# Patient Record
Sex: FEMALE | Race: WHITE | NOT HISPANIC OR LATINO | Employment: OTHER | URBAN - METROPOLITAN AREA
[De-identification: names, ages, dates, MRNs, and addresses within clinical notes are randomized per-mention and may not be internally consistent; named-entity substitution may affect disease eponyms.]

---

## 2017-01-21 ENCOUNTER — HOSPITAL ENCOUNTER (EMERGENCY)
Facility: CLINIC | Age: 32
Discharge: HOME | End: 2017-01-21
Attending: EMERGENCY MEDICINE | Admitting: EMERGENCY MEDICINE
Payer: COMMERCIAL

## 2017-01-21 ENCOUNTER — APPOINTMENT (OUTPATIENT)
Dept: GENERAL RADIOLOGY | Facility: CLINIC | Age: 32
End: 2017-01-21
Attending: EMERGENCY MEDICINE
Payer: COMMERCIAL

## 2017-01-21 VITALS
DIASTOLIC BLOOD PRESSURE: 96 MMHG | HEART RATE: 90 BPM | OXYGEN SATURATION: 95 % | SYSTOLIC BLOOD PRESSURE: 113 MMHG | TEMPERATURE: 98.3 F | RESPIRATION RATE: 18 BRPM | BODY MASS INDEX: 41.02 KG/M2 | HEIGHT: 71 IN | WEIGHT: 293 LBS

## 2017-01-21 DIAGNOSIS — R07.89 CHEST WALL PAIN: ICD-10-CM

## 2017-01-21 DIAGNOSIS — J06.9 UPPER RESPIRATORY TRACT INFECTION, UNSPECIFIED TYPE: ICD-10-CM

## 2017-01-21 DIAGNOSIS — R06.02 SOB (SHORTNESS OF BREATH): ICD-10-CM

## 2017-01-21 LAB
ALBUMIN SERPL-MCNC: 2.8 G/DL (ref 3.4–5)
ALP SERPL-CCNC: 63 U/L (ref 40–150)
ALT SERPL W P-5'-P-CCNC: 15 U/L (ref 0–50)
ANION GAP SERPL CALCULATED.3IONS-SCNC: 9 MMOL/L (ref 3–14)
AST SERPL W P-5'-P-CCNC: 13 U/L (ref 0–45)
BASOPHILS # BLD AUTO: 0 10E9/L (ref 0–0.2)
BASOPHILS NFR BLD AUTO: 0.6 %
BILIRUB SERPL-MCNC: 0.2 MG/DL (ref 0.2–1.3)
BUN SERPL-MCNC: 12 MG/DL (ref 7–30)
CALCIUM SERPL-MCNC: 8.9 MG/DL (ref 8.5–10.1)
CHLORIDE SERPL-SCNC: 101 MMOL/L (ref 94–109)
CO2 SERPL-SCNC: 31 MMOL/L (ref 20–32)
CREAT SERPL-MCNC: 0.76 MG/DL (ref 0.52–1.04)
D DIMER PPP FEU-MCNC: 0.4 UG/ML FEU (ref 0–0.5)
DIFFERENTIAL METHOD BLD: ABNORMAL
EOSINOPHIL # BLD AUTO: 0.1 10E9/L (ref 0–0.7)
EOSINOPHIL NFR BLD AUTO: 2 %
ERYTHROCYTE [DISTWIDTH] IN BLOOD BY AUTOMATED COUNT: 15 % (ref 10–15)
GFR SERPL CREATININE-BSD FRML MDRD: 87 ML/MIN/1.7M2
GLUCOSE SERPL-MCNC: 111 MG/DL (ref 70–99)
HCG SERPL QL: NEGATIVE
HCT VFR BLD AUTO: 42 % (ref 35–47)
HGB BLD-MCNC: 12.7 G/DL (ref 11.7–15.7)
IMM GRANULOCYTES # BLD: 0 10E9/L (ref 0–0.4)
IMM GRANULOCYTES NFR BLD: 0.1 %
LYMPHOCYTES # BLD AUTO: 3.1 10E9/L (ref 0.8–5.3)
LYMPHOCYTES NFR BLD AUTO: 45.2 %
MCH RBC QN AUTO: 24.9 PG (ref 26.5–33)
MCHC RBC AUTO-ENTMCNC: 30.2 G/DL (ref 31.5–36.5)
MCV RBC AUTO: 82 FL (ref 78–100)
MONOCYTES # BLD AUTO: 0.4 10E9/L (ref 0–1.3)
MONOCYTES NFR BLD AUTO: 6.1 %
NEUTROPHILS # BLD AUTO: 3.2 10E9/L (ref 1.6–8.3)
NEUTROPHILS NFR BLD AUTO: 46 %
NRBC # BLD AUTO: 0 10*3/UL
NRBC BLD AUTO-RTO: 0 /100
NT-PROBNP SERPL-MCNC: 108 PG/ML (ref 0–450)
PLATELET # BLD AUTO: 249 10E9/L (ref 150–450)
POTASSIUM SERPL-SCNC: 4.2 MMOL/L (ref 3.4–5.3)
PROT SERPL-MCNC: 6.7 G/DL (ref 6.8–8.8)
RBC # BLD AUTO: 5.11 10E12/L (ref 3.8–5.2)
SODIUM SERPL-SCNC: 141 MMOL/L (ref 133–144)
TROPONIN I SERPL-MCNC: NORMAL UG/L (ref 0–0.04)
WBC # BLD AUTO: 6.9 10E9/L (ref 4–11)

## 2017-01-21 PROCEDURE — 84703 CHORIONIC GONADOTROPIN ASSAY: CPT | Performed by: EMERGENCY MEDICINE

## 2017-01-21 PROCEDURE — 99285 EMERGENCY DEPT VISIT HI MDM: CPT

## 2017-01-21 PROCEDURE — 80053 COMPREHEN METABOLIC PANEL: CPT | Performed by: EMERGENCY MEDICINE

## 2017-01-21 PROCEDURE — 85025 COMPLETE CBC W/AUTO DIFF WBC: CPT | Performed by: EMERGENCY MEDICINE

## 2017-01-21 PROCEDURE — 25000125 ZZHC RX 250: Performed by: EMERGENCY MEDICINE

## 2017-01-21 PROCEDURE — 71020 XR CHEST 2 VW: CPT

## 2017-01-21 PROCEDURE — 85379 FIBRIN DEGRADATION QUANT: CPT | Performed by: EMERGENCY MEDICINE

## 2017-01-21 PROCEDURE — 84484 ASSAY OF TROPONIN QUANT: CPT | Performed by: EMERGENCY MEDICINE

## 2017-01-21 PROCEDURE — 96374 THER/PROPH/DIAG INJ IV PUSH: CPT

## 2017-01-21 PROCEDURE — 83880 ASSAY OF NATRIURETIC PEPTIDE: CPT | Performed by: EMERGENCY MEDICINE

## 2017-01-21 PROCEDURE — 93005 ELECTROCARDIOGRAM TRACING: CPT

## 2017-01-21 RX ORDER — KETOROLAC TROMETHAMINE 30 MG/ML
30 INJECTION, SOLUTION INTRAMUSCULAR; INTRAVENOUS ONCE
Status: COMPLETED | OUTPATIENT
Start: 2017-01-21 | End: 2017-01-21

## 2017-01-21 RX ORDER — LIDOCAINE 40 MG/G
CREAM TOPICAL
Status: DISCONTINUED | OUTPATIENT
Start: 2017-01-21 | End: 2017-01-21 | Stop reason: HOSPADM

## 2017-01-21 RX ADMIN — KETOROLAC TROMETHAMINE 30 MG: 30 INJECTION, SOLUTION INTRAMUSCULAR at 07:10

## 2017-01-21 ASSESSMENT — ENCOUNTER SYMPTOMS
FEVER: 0
LIGHT-HEADEDNESS: 1
NAUSEA: 1
SHORTNESS OF BREATH: 1
COUGH: 0
SORE THROAT: 0
VOICE CHANGE: 1
VOMITING: 0

## 2017-01-21 NOTE — ED AVS SNAPSHOT
[facility]    [address]    [place] [address]    Phone:  [phone]    Fax:  [phone]                                       Shannan Savage   MRN: [id number]    Department:  [facility]   Date of Visit:  1/21/2017           After Visit Summary Signature Page     I have received my discharge instructions, and my questions have been answered. I have discussed any challenges I see with this plan with the nurse or doctor.    ..........................................................................................................................................  Patient/Patient Representative Signature      ..........................................................................................................................................  Patient Representative Print Name and Relationship to Patient    ..................................................               ................................................  Date                                            Time    ..........................................................................................................................................  Reviewed by Signature/Title    ...................................................              ..............................................  Date                                                            Time

## 2017-01-21 NOTE — DISCHARGE INSTRUCTIONS
Discharge Instructions  Chest Pain    You have been seen today for chest pain or discomfort.  At this time, your doctor has found no signs that your chest pain is due to a serious or life-threatening condition, (or you have declined more testing and/or admission to the hospital). However, sometimes there is a serious problem that does not show up right away. Your evaluation today may not be complete and you may need further testing and evaluation.     You need to follow-up with your regular doctor within 3 days.    Return to the Emergency Department if:    Your chest pain changes, gets worse, starts to happen more often, or comes with less activity.    You are short of breath.    You get very weak or tired.    You pass out or faint.    You have any new symptoms, like fever, cough, numb legs, or you cough up blood.    You have anything else that worries you.    Until you follow-up with your regular doctor please do the following:    Take one aspirin daily unless you have an allergy or are told not to by your doctor.    If a stress test appointment has been made, go to the appointment.    If you have questions, contact your regular doctor.    If your doctor today has told you to follow-up with your regular doctor, it is very important that you make an appointment with your clinic and go to the appointment.  If you do not follow-up with your primary doctor, it may result in missing an important development which could result in permanent injury or disability and/or lasting pain.  If there is any problem keeping your appointment, call your doctor or return to the Emergency Department.    If you were given a prescription for medicine here today, be sure to read all of the information (including the package insert) that comes with your prescription.  This will include important information about the medicine, its side effects, and any warnings that you need to know about.  The pharmacist who fills the prescription can  provide more information and answer questions you may have about the medicine.  If you have questions or concerns that the pharmacist cannot address, please call or return to the Emergency Department.     Opioid Medication Information    Pain medications are among the most commonly prescribed medicines, so we are including this information for all our patients. If you did not receive pain medication or get a prescription for pain medicine, you can ignore it.     You may have been given a prescription for an opioid (narcotic) pain medicine and/or have received a pain medicine while here in the Emergency Department. These medicines can make you drowsy or impaired. You must not drive, operate dangerous equipment, or engage in any other dangerous activities while taking these medications. If you drive while taking these medications, you could be arrested for DUI, or driving under the influence. Do not drink any alcohol while you are taking these medications.     Opioid pain medications can cause addiction. If you have a history of chemical dependency of any type, you are at a higher risk of becoming addicted to pain medications.  Only take these prescribed medications to treat your pain when all other options have been tried. Take it for as short a time and as few doses as possible. Store your pain pills in a secure place, as they are frequently stolen and provide a dangerous opportunity for children or visitors in your house to start abusing these powerful medications. We will not replace any lost or stolen medicine.  As soon as your pain is better, you should flush all your remaining medication.     Many prescription pain medications contain Tylenol  (acetaminophen), including Vicodin , Tylenol #3 , Norco , Lortab , and Percocet .  You should not take any extra pills of Tylenol  if you are using these prescription medications or you can get very sick.  Do not ever take more than 3000 mg of acetaminophen in any 24 hour  period.    All opioids tend to cause constipation. Drink plenty of water and eat foods that have a lot of fiber, such as fruits, vegetables, prune juice, apple juice and high fiber cereal.  Take a laxative if you don t move your bowels at least every other day. Miralax , Milk of Magnesia, Colace , or Senna  can be used to keep you regular.      Remember that you can always come back to the Emergency Department if you are not able to see your regular doctor in the amount of time listed above, if you get any new symptoms, or if there is anything that worries you.

## 2017-01-21 NOTE — ED PROVIDER NOTES
"  History     Chief Complaint:  Chest Pain and Shortness of Breath      HPI   Shannan Savage is a 31 year old female with history of prediabetes and morbid obesity who presents to the emergency department via EMS with chest pain and shortness of breath.  The patient woke at 0430 this morning and on sitting up felt \"dizzy\" described not as if the room was spinning but as if she were going to pass out and nauseous.  She was able to ambulate to the bathroom and after sitting down she noticed midsternal chest pain that was tender to palpation and associated by increased lightheadedness and shortness of breath so she yelled for her mother who she lives with who then contacted EMS.  Here in the emergency department patient reports her chest is  but shortness of breath has largely resolved; the shortness of breath lasted approximately 30 minutes.  She describes her shortness of breath as a sensation like she was unable to fill her lungs completely.  Patient also reports her voice is hoarse which is new today.  She denies recent illness including cough, congestion, sore throat, vomiting, diarrhea, trouble with gait, or other complaint.      CARDIAC RISK FACTORS:  Sex:    F  Tobacco:   Neg  Hypertension:   Neg  Hyperlipidemia:  Neg  Diabetes:   Prediabetes  Family History:  Neg    PE/DVT RISK FACTORS:  Sex:    F  Hormones:   Neg  Tobacco:   Neg  Cancer:   Neg  Travel:   Neg  Surgery:   Neg  Other immobilization: Neg  Personal history:  Neg  Family history:  Neg     Allergies:  Codeine      Medications:    Citalopram hydrobromide  Flexeril  Norco  Imitrex  Depakote  Jasmine  Ponstel  Midrin  Vitamin D + cholecalciferol   Topiramate     Past Medical History:    Morbid obesity  Migraine  Bipolar 1 disorder  Prediabetes    Past Surgical History:    GI surgery     Family History:    Father - Mesothelioma      Social History:  Presents via EMS; mother and sister met in ED  Tobacco use: Never  Alcohol use: Negative " "  Marital Status:  Single        Review of Systems   Constitutional: Negative for fever.   HENT: Positive for voice change. Negative for congestion and sore throat.    Respiratory: Positive for shortness of breath. Negative for cough.    Cardiovascular: Positive for chest pain.   Gastrointestinal: Positive for nausea. Negative for vomiting.   Musculoskeletal: Negative for gait problem.   Neurological: Positive for light-headedness.   All other systems reviewed and are negative.      Physical Exam     Patient Vitals for the past 24 hrs:   BP Temp Temp src Pulse Heart Rate Resp SpO2 Height Weight   01/21/17 0535 123/65 mmHg 98.3  F (36.8  C) Oral 90 90 18 95 % 1.803 m (5' 11\") (!) 190.511 kg (420 lb)       Physical Exam  General: The patient is alert, in no respiratory distress.  Morbid obesity.    HENT: Mucous membranes moist.    Cardiovascular: Regular rate and rhythm. Good pulses in all four extremities. Normal capillary refill and skin turgor.     Respiratory: Lungs are clear. No nasal flaring. No retractions. No wheezing, no crackles.    Gastrointestinal: Abdomen soft. No guarding, no rebound. No palpable hernias.     Musculoskeletal: No gross deformity.  Exquisite tenderness to palpation over sternum in area of indicated pain.      Skin: No rashes or petechiae.     Neurologic: The patient is alert and oriented x3. GCS 15. No testable cranial nerve deficit. Follows commands with clear and appropriate speech. Gives appropriate answers. Good strength in all extremities. No gross neurologic deficit. Gross sensation intact. Pupils are round and reactive. No meningismus.     Lymphatic: No cervical adenopathy. No lower extremity swelling.    Psychiatric: The patient is non-tearful.     Emergency Department Course   ECG (05:36:36):  Rate 92 bpm. ID interval 140. QRS duration 72. QT/QTc 336/415. P-R-T axes 41 48 50.  Normal sinus rhythm.  Normal ECG.  Interpreted at 0521 by Jody Almanza MD.     Imaging:  Radiographic " findings were communicated with the patient who voiced understanding of the findings.    XR Chest:  IMPRESSION: No acute abnormality.    CHHAYA PHILLIPS MD    Preliminary result per radiology.    Laboratory:  CBC: WNL (WBC 6.9, HGB 12.7, )   CMP: Albumin 2.8 (L), Protein total 6.7 (L), Glucose 111 (H) ow WNL (Creatinine 0.76)   D-dimer: 0.4   0536: Troponin: <0.015   Nt proBNP: 108    HCG qual: Negative    Interventions:  0710: Toradol 30 mg IV     Emergency Department Course:  The patient arrived in the emergency department via EMS.  Patient initially seen by my partner Dr. Almanza.   IV inserted and blood drawn. The patient was placed on continuous cardiac monitoring and pulse oximetry.   Past medical records, nursing notes, and vitals reviewed.  0605: I performed an exam of the patient as documented above.    The patient was sent for a XR while in the emergency department, findings above.   0704: I rechecked the patient. Explained findings to patient and family.  She was still experiencing pain.   I personally reviewed the laboratory results with the Patient and mother and sister and answered all related questions prior to discharge.    0731: I rechecked the patient.  Findings and plan explained to the Patient and mother and sister. Patient discharged home with instructions regarding supportive care, medications, and reasons to return. The importance of close follow-up was reviewed.      Impression & Plan    Medical Decision Making:  Shannan Savage is a 31 year old female reporting she first felt dizzy this morning which sounds more like lightheaded type neurologic cause and reports she has had cold-like symptoms prior to this starting.  Only later did she complain of chest pain and shortness of breath.  During this whole episode, however, she was able to yell and talk and here she is currently speaking in full sentences.  Her pulmonary exam sounds quite clear, I do not think she likely has CHF, PE,  pneumonia, or other cause.  Her d-dimer did come back negative.  Troponin is negative too despite having symptoms for some time.  This is only a screening study, however, I felt as chest pain is reproducible with light palpation this is likely inflammatory given she has a cold on top of this.  Patient walked in the ER after workup was negative without any problems.  She currently says her breathing is doing better.  I started her on scheduled antiinflammatories.  WE discussed risk factor modification but I felt that right now there is no sign of cardiac ischemia and her story is unlikely for a heart related cause and more likely secondary to URI.  She was discharged to close follow up.     Diagnosis:    ICD-10-CM    1. Chest wall pain R07.89    2. SOB (shortness of breath) R06.02    3. Upper respiratory tract infection, unspecified type J06.9        Disposition:  Discharged to home with plan as outlined.      Florentino Grant  1/21/2017   [facility] EMERGENCY DEPARTMENT    I, Florentino Grant, am serving as a scribe at 6:05 AM on 1/21/2017 to document services personally performed by Cristobal Saavedra MD based on my observations and the provider's statements to me.      Cristobal Saavedra MD  01/22/17 [id number]

## 2017-01-21 NOTE — ED NOTES
Bed: ED15  Expected date: 1/21/17  Expected time: 5:17 AM  Means of arrival: Ambulance  Comments:  Santi Vaca

## 2017-01-21 NOTE — ED AVS SNAPSHOT
[facility]    [address] [place] [address]    Phone:  [phone]    Fax:  [phone]                                       Shannna Savage   MRN: [id number]    Department:  [facility]   Date of Visit:  1/21/2017           Patient Information     Date Of Birth          1985        Your diagnoses for this visit were:     Chest wall pain     SOB (shortness of breath)     Upper respiratory tract infection, unspecified type        You were seen by Jody Almanza MD and Cristobal Saavedra MD.      Follow-up Information     Follow up with Omer Ziegler MD. Schedule an appointment as soon as possible for a visit in 3 days.    Specialty:  Family Practice    Contact information:    [facility]  [address]  [facility] [address]  [phone]          Discharge Instructions       Discharge Instructions  Chest Pain    You have been seen today for chest pain or discomfort.  At this time, your doctor has found no signs that your chest pain is due to a serious or life-threatening condition, (or you have declined more testing and/or admission to the hospital). However, sometimes there is a serious problem that does not show up right away. Your evaluation today may not be complete and you may need further testing and evaluation.     You need to follow-up with your regular doctor within 3 days.    Return to the Emergency Department if:    Your chest pain changes, gets worse, starts to happen more often, or comes with less activity.    You are short of breath.    You get very weak or tired.    You pass out or faint.    You have any new symptoms, like fever, cough, numb legs, or you cough up blood.    You have anything else that worries you.    Until you follow-up with your regular doctor please do the following:    Take one aspirin daily unless you have an allergy or are told not to by your doctor.    If a  stress test appointment has been made, go to the appointment.    If you have questions, contact your regular doctor.    If your doctor today has told you to follow-up with your regular doctor, it is very important that you make an appointment with your clinic and go to the appointment.  If you do not follow-up with your primary doctor, it may result in missing an important development which could result in permanent injury or disability and/or lasting pain.  If there is any problem keeping your appointment, call your doctor or return to the Emergency Department.    If you were given a prescription for medicine here today, be sure to read all of the information (including the package insert) that comes with your prescription.  This will include important information about the medicine, its side effects, and any warnings that you need to know about.  The pharmacist who fills the prescription can provide more information and answer questions you may have about the medicine.  If you have questions or concerns that the pharmacist cannot address, please call or return to the Emergency Department.     Opioid Medication Information    Pain medications are among the most commonly prescribed medicines, so we are including this information for all our patients. If you did not receive pain medication or get a prescription for pain medicine, you can ignore it.     You may have been given a prescription for an opioid (narcotic) pain medicine and/or have received a pain medicine while here in the Emergency Department. These medicines can make you drowsy or impaired. You must not drive, operate dangerous equipment, or engage in any other dangerous activities while taking these medications. If you drive while taking these medications, you could be arrested for DUI, or driving under the influence. Do not drink any alcohol while you are taking these medications.     Opioid pain medications can cause addiction. If you have a history of  chemical dependency of any type, you are at a higher risk of becoming addicted to pain medications.  Only take these prescribed medications to treat your pain when all other options have been tried. Take it for as short a time and as few doses as possible. Store your pain pills in a secure place, as they are frequently stolen and provide a dangerous opportunity for children or visitors in your house to start abusing these powerful medications. We will not replace any lost or stolen medicine.  As soon as your pain is better, you should flush all your remaining medication.     Many prescription pain medications contain Tylenol  (acetaminophen), including Vicodin , Tylenol #3 , Norco , Lortab , and Percocet .  You should not take any extra pills of Tylenol  if you are using these prescription medications or you can get very sick.  Do not ever take more than 3000 mg of acetaminophen in any 24 hour period.    All opioids tend to cause constipation. Drink plenty of water and eat foods that have a lot of fiber, such as fruits, vegetables, prune juice, apple juice and high fiber cereal.  Take a laxative if you don t move your bowels at least every other day. Miralax , Milk of Magnesia, Colace , or Senna  can be used to keep you regular.      Remember that you can always come back to the Emergency Department if you are not able to see your regular doctor in the amount of time listed above, if you get any new symptoms, or if there is anything that worries you.          24 Hour Appointment Hotline       To make an appointment at any [facility], call [phone] ([phone]). If you don't have a family doctor or clinic, we will help you find one. [facility] are conveniently located to serve the needs of you and your family.             Review of your medicines      Our records show that you are taking the medicines listed below. If these are incorrect, please call your family doctor or clinic.        Dose /  Directions Last dose taken    NANCY PO   Dose:  0.5 mg        Take 0.5 mg by mouth   Refills:  0        CITALOPRAM HYDROBROMIDE PO   Dose:  20 mg        Take 20 mg by mouth 2 times daily   Refills:  0        DEPAKOTE PO   Dose:  150 mg        Take 150 mg by mouth   Refills:  0        FLEXERIL PO   Dose:  5 mg        Take 5 mg by mouth   Refills:  0        HYDROcodone-acetaminophen 5-325 MG per tablet   Commonly known as:  NORCO   Dose:  1 tablet        Take 1 tablet by mouth every 6 hours as needed for moderate to severe pain   Refills:  0        mefenamic acid 250 MG Caps capsule   Commonly known as:  PONSTEL   Dose:  250 mg        Take 250 mg by mouth every 6 hours as needed for moderate pain   Refills:  0        MIDRIN PO   Dose:  150 mg        Take 150 mg by mouth   Refills:  0        SUMAtriptan Succinate Refill 6 MG/0.5ML Soct   Commonly known as:  IMITREX        Inject Subcutaneous once   Refills:  0        TOPIRAMATE PO   Dose:  150 mg        Take 150 mg by mouth   Refills:  0        VITAMIN D (CHOLECALCIFEROL) PO   Dose:  99133 Units        Take 50,000 Units by mouth daily   Refills:  0                Procedures and tests performed during your visit     CBC with platelets differential    Cardiac Continuous Monitoring    Comprehensive metabolic panel    D dimer quantitative    EKG 12-lead, tracing only    HCG qualitative    Nt probnp inpatient    Peripheral IV: Standard    Pulse oximetry nursing    Troponin I    Vital signs    XR Chest 2 Views      Orders Needing Specimen Collection     None      Pending Results     No orders found from 1/20/2017 to 1/22/2017.            Pending Culture Results     No orders found from 1/20/2017 to 1/22/2017.       Test Results from your hospital stay           1/21/2017  6:02 AM - Interface, xzoops Results      Component Results     Component Value Ref Range & Units Status    WBC 6.9 4.0 - 11.0 10e9/L Final    RBC Count 5.11 3.8 - 5.2 10e12/L Final    Hemoglobin 12.7  11.7 - 15.7 g/dL Final    Hematocrit 42.0 35.0 - 47.0 % Final    MCV 82 78 - 100 fl Final    MCH 24.9 (L) 26.5 - 33.0 pg Final    MCHC 30.2 (L) 31.5 - 36.5 g/dL Final    RDW 15.0 10.0 - 15.0 % Final    Platelet Count 249 150 - 450 10e9/L Final    Diff Method Automated Method  Final    % Neutrophils 46.0 % Final    % Lymphocytes 45.2 % Final    % Monocytes 6.1 % Final    % Eosinophils 2.0 % Final    % Basophils 0.6 % Final    % Immature Granulocytes 0.1 % Final    Nucleated RBCs 0 0 /100 Final    Absolute Neutrophil 3.2 1.6 - 8.3 10e9/L Final    Absolute Lymphocytes 3.1 0.8 - 5.3 10e9/L Final    Absolute Monocytes 0.4 0.0 - 1.3 10e9/L Final    Absolute Eosinophils 0.1 0.0 - 0.7 10e9/L Final    Absolute Basophils 0.0 0.0 - 0.2 10e9/L Final    Abs Immature Granulocytes 0.0 0 - 0.4 10e9/L Final    Absolute Nucleated RBC 0.0  Final         1/21/2017  6:23 AM - Interface, Flexilab Results      Component Results     Component Value Ref Range & Units Status    Sodium 141 133 - 144 mmol/L Final    Potassium 4.2 3.4 - 5.3 mmol/L Final    Chloride 101 94 - 109 mmol/L Final    Carbon Dioxide 31 20 - 32 mmol/L Final    Anion Gap 9 3 - 14 mmol/L Final    Glucose 111 (H) 70 - 99 mg/dL Final    Urea Nitrogen 12 7 - 30 mg/dL Final    Creatinine 0.76 0.52 - 1.04 mg/dL Final    GFR Estimate 87 >60 mL/min/1.7m2 Final    Non  GFR Calc    GFR Estimate If Black >90   GFR Calc   >60 mL/min/1.7m2 Final    Calcium 8.9 8.5 - 10.1 mg/dL Final    Bilirubin Total 0.2 0.2 - 1.3 mg/dL Final    Albumin 2.8 (L) 3.4 - 5.0 g/dL Final    Protein Total 6.7 (L) 6.8 - 8.8 g/dL Final    Alkaline Phosphatase 63 40 - 150 U/L Final    ALT 15 0 - 50 U/L Final    AST 13 0 - 45 U/L Final         1/21/2017  6:13 AM - Interface, Flexilab Results      Component Results     Component Value Ref Range & Units Status    D Dimer 0.4 0.0 - 0.50 ug/ml FEU Final    This D-dimer assay is intended for use in conjuntion with a clinical  pretest   probability assessment model to exclude pulmonary embolism (PE) and as an aid   in the diagnosis of deep venous thrombosis (DVT) in outpatients suspected of   PE   or DVT. The cut-off value is 0.5 g/mL FEU.           1/21/2017  6:24 AM - Interface, Flexilab Results      Component Results     Component Value Ref Range & Units Status    Troponin I ES  0.000 - 0.045 ug/L Final    <0.015  The 99th percentile for upper reference range is 0.045 ug/L.  Troponin values in   the range of 0.045 - 0.120 ug/L may be associated with risks of adverse   clinical events.           1/21/2017  6:24 AM - Interface, Flexilab Results      Component Results     Component Value Ref Range & Units Status    N-Terminal Pro BNP Inpatient 108 0 - 450 pg/mL Final    Reference range shown and results flagged as abnormal are suggested inpatient   cut points for confirming diagnosis if CHF in an acute setting. Establishing   a   baseline value for each individual patient is useful for follow-up. An   inpatient or emergency department NT-proPBNP <300 pg/mL effectively rules out   acute CHF, with 99% negative predictive value.  The outpatient non-acute reference range for ruling out CHF is:   0-125 pg/mL (age 18 to less than 75)   0-450 pg/mL (age 75 yrs and older)           1/21/2017  6:43 AM - Interface, Radiant Ib      Narrative     XR CHEST 2 VW  1/21/2017 6:26 AM      HISTORY: Shortness of breath.     COMPARISON: 3/24/2015.    FINDINGS: The heart size is normal. The lungs are clear. No  pneumothorax or pleural effusion.        Impression     IMPRESSION: No acute abnormality.    CHHAYA PHILLIPS MD         1/21/2017  6:23 AM - Interface, Flexilab Results      Component Results     Component Value Ref Range & Units Status    HCG Qualitative Serum Negative NEG Final                Clinical Quality Measure: Blood Pressure Screening     Your blood pressure was checked while you were in the emergency department today. The last reading we  "obtained was  BP: 123/65 mmHg . Please read the guidelines below about what these numbers mean and what you should do about them.  If your systolic blood pressure (the top number) is less than 120 and your diastolic blood pressure (the bottom number) is less than 80, then your blood pressure is normal. There is nothing more that you need to do about it.  If your systolic blood pressure (the top number) is 120-139 or your diastolic blood pressure (the bottom number) is 80-89, your blood pressure may be higher than it should be. You should have your blood pressure rechecked within a year by a primary care provider.  If your systolic blood pressure (the top number) is 140 or greater or your diastolic blood pressure (the bottom number) is 90 or greater, you may have high blood pressure. High blood pressure is treatable, but if left untreated over time it can put you at risk for heart attack, stroke, or kidney failure. You should have your blood pressure rechecked by a primary care provider within the next 4 weeks.  If your provider in the emergency department today gave you specific instructions to follow-up with your doctor or provider even sooner than that, you should follow that instruction and not wait for up to 4 weeks for your follow-up visit.        Thank you for choosing [facility]       Thank you for choosing [facility] for your care. Our goal is always to provide you with excellent care. Hearing back from our patients is one way we can continue to improve our services. Please take a few minutes to complete the written survey that you may receive in the mail after you visit with us. Thank you!        [facility] Information     [employer] lets you send messages to your doctor, view your test results, renew your prescriptions, schedule appointments and more. To sign up, go to [email or web address] . Click on \"Log in\" on the left side of the screen, which will take you to the Welcome page. Then click on \"Sign up Now\" on " the right side of the page.     You will be asked to enter the access code listed below, as well as some personal information. Please follow the directions to create your username and password.     Your access code is: [email or web address]  Expires: 2017  7:31 AM     Your access code will  in 90 days. If you need help or a new code, please call your [facility] clinic or [phone].        Care EveryWhere ID     This is your Care EveryWhere ID. This could be used by other organizations to access your [facility] medical records  [id number]        After Visit Summary       This is your record. Keep this with you and show to your community pharmacist(s) and doctor(s) at your next visit.

## 2017-01-21 NOTE — ED NOTES
BIB EMS after waking to feeling dizzy, got up to BR and began to have SOB and CP at the same time; states she was not exerting self when it happened; states chest pain is substernal and nonradiating; LS clear bilaterally  ABCs intact.

## 2017-01-23 LAB — INTERPRETATION ECG - MUSE: NORMAL

## 2017-03-27 ENCOUNTER — TELEPHONE (OUTPATIENT)
Dept: FAMILY MEDICINE | Facility: CLINIC | Age: 32
End: 2017-03-27

## 2017-03-27 NOTE — TELEPHONE ENCOUNTER
Shannan Savage is a 31 year old female who calls   Was seen at [facility] over the weekend and was told need to see an OB/Gyn doctor right away.  We gave her [facility] and [facility] scheduling numbers.  Pt informed to have insurance care with her when making these calls. We cannot say whether or not these clinics are covered with her insurance plan. Caller agrees to plan.    (Next open appointment with Dr. Altamirano in our office 4/21/17, Dr. Reid in  4/24/17)  Rubio Carpenter RN

## 2017-03-30 ENCOUNTER — APPOINTMENT (OUTPATIENT)
Dept: ULTRASOUND IMAGING | Facility: CLINIC | Age: 32
End: 2017-03-30
Attending: EMERGENCY MEDICINE
Payer: COMMERCIAL

## 2017-03-30 ENCOUNTER — HOSPITAL ENCOUNTER (EMERGENCY)
Facility: CLINIC | Age: 32
Discharge: HOME | End: 2017-03-30
Attending: EMERGENCY MEDICINE | Admitting: EMERGENCY MEDICINE
Payer: COMMERCIAL

## 2017-03-30 VITALS
HEART RATE: 103 BPM | RESPIRATION RATE: 18 BRPM | DIASTOLIC BLOOD PRESSURE: 83 MMHG | TEMPERATURE: 97.8 F | SYSTOLIC BLOOD PRESSURE: 129 MMHG | OXYGEN SATURATION: 95 %

## 2017-03-30 DIAGNOSIS — R42 DIZZINESS: ICD-10-CM

## 2017-03-30 DIAGNOSIS — O20.9 VAGINAL BLEEDING IN PREGNANCY, FIRST TRIMESTER: ICD-10-CM

## 2017-03-30 DIAGNOSIS — R10.9 ABDOMINAL CRAMPING: ICD-10-CM

## 2017-03-30 LAB
ALBUMIN UR-MCNC: 100 MG/DL
APPEARANCE UR: ABNORMAL
B-HCG SERPL-ACNC: 4687 IU/L (ref 0–5)
BACTERIA #/AREA URNS HPF: ABNORMAL /HPF
BASOPHILS # BLD AUTO: 0 10E9/L (ref 0–0.2)
BASOPHILS NFR BLD AUTO: 0.8 %
BILIRUB UR QL STRIP: NEGATIVE
COLOR UR AUTO: ABNORMAL
DIFFERENTIAL METHOD BLD: ABNORMAL
EOSINOPHIL # BLD AUTO: 0.1 10E9/L (ref 0–0.7)
EOSINOPHIL NFR BLD AUTO: 0.9 %
ERYTHROCYTE [DISTWIDTH] IN BLOOD BY AUTOMATED COUNT: 16.6 % (ref 10–15)
GLUCOSE UR STRIP-MCNC: NEGATIVE MG/DL
HCT VFR BLD AUTO: 39.2 % (ref 35–47)
HGB BLD-MCNC: 12 G/DL (ref 11.7–15.7)
HGB UR QL STRIP: ABNORMAL
IMM GRANULOCYTES # BLD: 0 10E9/L (ref 0–0.4)
IMM GRANULOCYTES NFR BLD: 0.2 %
KETONES UR STRIP-MCNC: NEGATIVE MG/DL
LEUKOCYTE ESTERASE UR QL STRIP: ABNORMAL
LYMPHOCYTES # BLD AUTO: 2.2 10E9/L (ref 0.8–5.3)
LYMPHOCYTES NFR BLD AUTO: 41 %
MCH RBC QN AUTO: 24.8 PG (ref 26.5–33)
MCHC RBC AUTO-ENTMCNC: 30.6 G/DL (ref 31.5–36.5)
MCV RBC AUTO: 81 FL (ref 78–100)
MICRO REPORT STATUS: NORMAL
MONOCYTES # BLD AUTO: 0.3 10E9/L (ref 0–1.3)
MONOCYTES NFR BLD AUTO: 5.6 %
MUCOUS THREADS #/AREA URNS LPF: PRESENT /LPF
NEUTROPHILS # BLD AUTO: 2.7 10E9/L (ref 1.6–8.3)
NEUTROPHILS NFR BLD AUTO: 51.5 %
NITRATE UR QL: NEGATIVE
NRBC # BLD AUTO: 0 10*3/UL
NRBC BLD AUTO-RTO: 0 /100
PH UR STRIP: 5 PH (ref 5–7)
PLATELET # BLD AUTO: 233 10E9/L (ref 150–450)
RBC # BLD AUTO: 4.84 10E12/L (ref 3.8–5.2)
RBC #/AREA URNS AUTO: >182 /HPF (ref 0–2)
SP GR UR STRIP: 1.02 (ref 1–1.03)
SPECIMEN SOURCE: NORMAL
SQUAMOUS #/AREA URNS AUTO: 14 /HPF (ref 0–1)
URN SPEC COLLECT METH UR: ABNORMAL
UROBILINOGEN UR STRIP-MCNC: 2 MG/DL (ref 0–2)
WBC # BLD AUTO: 5.3 10E9/L (ref 4–11)
WBC #/AREA URNS AUTO: 6 /HPF (ref 0–2)
WET PREP SPEC: NORMAL

## 2017-03-30 PROCEDURE — 76801 OB US < 14 WKS SINGLE FETUS: CPT

## 2017-03-30 PROCEDURE — 25000128 H RX IP 250 OP 636: Performed by: EMERGENCY MEDICINE

## 2017-03-30 PROCEDURE — 96361 HYDRATE IV INFUSION ADD-ON: CPT

## 2017-03-30 PROCEDURE — 81001 URINALYSIS AUTO W/SCOPE: CPT | Performed by: EMERGENCY MEDICINE

## 2017-03-30 PROCEDURE — 99285 EMERGENCY DEPT VISIT HI MDM: CPT | Mod: 25

## 2017-03-30 PROCEDURE — 96374 THER/PROPH/DIAG INJ IV PUSH: CPT

## 2017-03-30 PROCEDURE — 85025 COMPLETE CBC W/AUTO DIFF WBC: CPT | Performed by: EMERGENCY MEDICINE

## 2017-03-30 PROCEDURE — 87210 SMEAR WET MOUNT SALINE/INK: CPT | Performed by: EMERGENCY MEDICINE

## 2017-03-30 PROCEDURE — 84702 CHORIONIC GONADOTROPIN TEST: CPT | Performed by: EMERGENCY MEDICINE

## 2017-03-30 RX ORDER — MORPHINE SULFATE 4 MG/ML
4 INJECTION, SOLUTION INTRAMUSCULAR; INTRAVENOUS ONCE
Status: COMPLETED | OUTPATIENT
Start: 2017-03-30 | End: 2017-03-30

## 2017-03-30 RX ORDER — LIDOCAINE 40 MG/G
CREAM TOPICAL
Status: DISCONTINUED | OUTPATIENT
Start: 2017-03-30 | End: 2017-03-31 | Stop reason: HOSPADM

## 2017-03-30 RX ADMIN — SODIUM CHLORIDE 1000 ML: 9 INJECTION, SOLUTION INTRAVENOUS at 18:56

## 2017-03-30 RX ADMIN — MORPHINE SULFATE 4 MG: 4 INJECTION, SOLUTION INTRAMUSCULAR; INTRAVENOUS at 18:56

## 2017-03-30 ASSESSMENT — ENCOUNTER SYMPTOMS
BRUISES/BLEEDS EASILY: 0
VOMITING: 0
DIARRHEA: 0
CONSTIPATION: 0
SHORTNESS OF BREATH: 0
FEVER: 0
DYSURIA: 0
FREQUENCY: 0
LIGHT-HEADEDNESS: 1
DIZZINESS: 1
CHILLS: 0
ABDOMINAL PAIN: 1
DIFFICULTY URINATING: 0
HEMATURIA: 0
NAUSEA: 0

## 2017-03-30 NOTE — ED AVS SNAPSHOT
[facility] Emergency Department    [address]    [facility] [address]    Phone:  [phone]    Fax:  [phone]                                       Shannan Savage   MRN: [id number]    Department:  [facility] Emergency Department   Date of Visit:  3/30/2017           Patient Information     Date Of Birth          1985        Your diagnoses for this visit were:     Vaginal bleeding in pregnancy, first trimester     Abdominal cramping     Dizziness        You were seen by Steph Deutsch MD.      Follow-up Information     Follow up with Your ob/gyn.    Why:  2-3 days for repeat pregnancy test, reassessment        Follow up with [facility] Emergency Department.    Specialty:  EMERGENCY MEDICINE    Why:  As needed, If symptoms worsen    Contact information:    [address]  [facility] [address]  [phone]        Discharge Instructions       Discharge Instructions  Vaginal Bleeding in Pregnancy    Bleeding in early pregnancy can be a sign of a miscarriage in process or an abnormal pregnancy, but often is innocent and the pregnancy will continue normally. We may do blood pregnancy tests and ultrasound to try to determine what is causing the bleeding in your case, but sometimes we can't tell and need to follow you with time, more blood tests, and another ultrasound.     Return to the Emergency Department if:    You have severe abdominal or pelvic pain.    You faint, or feel lightheaded or dizzy.     Your bleeding is gets much worse, and is heavier than a heavy period or if you pass any blood clots larger than a quarter.    You pass any tissue--solid material that doesn't appear smooth and even like a blood clot. If you pass tissue, save it (even if you have to pull it out of the toilet) and put it in a plastic bag or jar and bring it in.      You have a fever of 100.5 degrees or higher.  If no pregnancy could be seen:    It  may be that everything is normal and it is just too early to see the pregnancy, but you could have an ectopic pregnancy, which is a pregnancy in an abnormal location, such as in the tube. An ectopic pregnancy can cause severe internal bleeding or death.    You need to be seen by your regular doctor, or an OB/GYN doctor, in 48-72 hours for a repeat blood pregnancy test.    You should not be alone, in case you suddenly become very sick.     You should not have sex or put anything in your vagina.     If a pregnancy was seen in your uterus:    If a heartbeat could be seen, the chance of miscarriage is much lower.    You need to see your regular doctor, or an OB/GYN doctor, within 2-3 days.    You should not have sex or put anything in your vagina.     Facts about miscarriage: We hope you don't have a miscarriage, but if you do, here are important things to know:    Early miscarriage is very common, and having one miscarriage doesn't mean you will have problems with another pregnancy.    Nothing you did caused it. Taking medicine, drinking alcohol, having sex, exercising, or falling down won't cause a miscarriage.     If you were given a prescription for medicine here today, be sure to read all of the information (including the package insert) that comes with your prescription.  This will include important information about the medicine, its side effects, and any warnings that you need to know about.  The pharmacist who fills the prescription can provide more information and answer questions you may have about the medicine.  If you have questions or concerns that the pharmacist cannot address, please call or return to the Emergency Department.     Remember that you can always come back to the Emergency Department if you are not able to see your regular doctor in the amount of time listed above, if you get any new symptoms, or if there is anything that worries you.        Future Appointments        Provider Department  Dept Phone Center    4/6/2017 9:00 AM [facility] [phone] [place]    4/27/2017 1:00 PM Ann Reid,  [facility] [address] [phone] [place]      24 Hour Appointment Hotline       To make an appointment at any [facility], call [phone] ([phone]). If you don't have a family doctor or clinic, we will help you find one. [facility] are conveniently located to serve the needs of you and your family.             Review of your medicines      Our records show that you are taking the medicines listed below. If these are incorrect, please call your family doctor or clinic.        Dose / Directions Last dose taken    BENADRYL PO        Take by mouth daily as needed   Refills:  0        CITALOPRAM HYDROBROMIDE PO   Dose:  20 mg        Take 20 mg by mouth 2 times daily   Refills:  0        FLEXERIL PO   Dose:  5 mg        Take 5 mg by mouth   Refills:  0        HYDROcodone-acetaminophen 5-325 MG per tablet   Commonly known as:  NORCO   Dose:  1 tablet        Take 1 tablet by mouth every 6 hours as needed for moderate to severe pain   Refills:  0        SUMAtriptan Succinate Refill 6 MG/0.5ML Soct   Commonly known as:  IMITREX        Inject Subcutaneous once   Refills:  0        TYLENOL PO   Dose:  325 mg        Take 325 mg by mouth   Refills:  0        VITAMIN D (CHOLECALCIFEROL) PO   Dose:  33852 Units        Take 50,000 Units by mouth daily   Refills:  0                Procedures and tests performed during your visit     CBC with platelets differential    HCG QUANTitative pregnancy    Peripheral IV: Standard    Prep for procedure - pelvic exam    UA with Microscopic    US OB < 14 Weeks w Transvaginal    Wet prep      Orders Needing Specimen Collection     None      Pending Results     No orders found from 3/28/2017 to 3/31/2017.            Pending Culture Results     No orders found from 3/28/2017 to 3/31/2017.             Test Results from your  hospital stay     3/30/2017  7:49 PM - Interface, Flexilab Results      Component Results     Component    Specimen Description    Vagina    Wet Prep    Few PMNs seen  No Trichomonas seen  No clue cells seen  No yeast seen      Micro Report Status    FINAL 03/30/2017         3/30/2017  6:37 PM - Interface, Flexilab Results      Component Results     Component Value Ref Range & Units Status    WBC 5.3 4.0 - 11.0 10e9/L Final    RBC Count 4.84 3.8 - 5.2 10e12/L Final    Hemoglobin 12.0 11.7 - 15.7 g/dL Final    Hematocrit 39.2 35.0 - 47.0 % Final    MCV 81 78 - 100 fl Final    MCH 24.8 (L) 26.5 - 33.0 pg Final    MCHC 30.6 (L) 31.5 - 36.5 g/dL Final    RDW 16.6 (H) 10.0 - 15.0 % Final    Platelet Count 233 150 - 450 10e9/L Final    Diff Method Automated Method  Final    % Neutrophils 51.5 % Final    % Lymphocytes 41.0 % Final    % Monocytes 5.6 % Final    % Eosinophils 0.9 % Final    % Basophils 0.8 % Final    % Immature Granulocytes 0.2 % Final    Nucleated RBCs 0 0 /100 Final    Absolute Neutrophil 2.7 1.6 - 8.3 10e9/L Final    Absolute Lymphocytes 2.2 0.8 - 5.3 10e9/L Final    Absolute Monocytes 0.3 0.0 - 1.3 10e9/L Final    Absolute Eosinophils 0.1 0.0 - 0.7 10e9/L Final    Absolute Basophils 0.0 0.0 - 0.2 10e9/L Final    Abs Immature Granulocytes 0.0 0 - 0.4 10e9/L Final    Absolute Nucleated RBC 0.0  Final         3/30/2017  7:08 PM - Interface, Flexilab Results      Component Results     Component Value Ref Range & Units Status    HCG Quantitative Serum 4687 (H) 0 - 5 IU/L Final         3/30/2017  7:10 PM - Interface, Flexilab Results      Component Results     Component Value Ref Range & Units Status    Color Urine Shefali  Final    Appearance Urine Cloudy  Final    Glucose Urine Negative NEG mg/dL Final    Bilirubin Urine Negative NEG Final    Ketones Urine Negative NEG mg/dL Final    Specific Gravity Urine 1.024 1.003 - 1.035 Final    Blood Urine Large (A) NEG Final    pH Urine 5.0 5.0 - 7.0 pH Final     Protein Albumin Urine 100 (A) NEG mg/dL Final    Urobilinogen mg/dL 2.0 0.0 - 2.0 mg/dL Final    Nitrite Urine Negative NEG Final    Leukocyte Esterase Urine Trace (A) NEG Final    Source Midstream Urine  Final    WBC Urine 6 (H) 0 - 2 /HPF Final    RBC Urine >182 (H) 0 - 2 /HPF Final    Bacteria Urine Few (A) NEG /HPF Final    Squamous Epithelial /HPF Urine 14 (H) 0 - 1 /HPF Final    Mucous Urine Present (A) NEG /LPF Final         3/30/2017  8:34 PM - Interface, Radiant Ib      Narrative     US OB <14 WEEKS WITH TRANSVAGINAL SINGLE 3/30/2017 8:27 PM    CLINICAL HISTORY: Vaginal bleeding.      TECHNIQUE: Transabdominal and transvaginal scan is performed.     COMPARISON: None.     LMP: February 19, 2017.     FINDINGS: There is an ill-defined complex hypoechoic collection in the  endometrium, possibly reflecting a gestational sac. A fetal pole is  not well visualized. No fetal heart rate is detected.    The ovaries are not visualized. No free fluid in the pelvis.        Impression     IMPRESSION:   1. Ill-defined convex hypoechoic collection in the endometrium,  possibly a gestational sac. No fetal pole or heart rate is detectable.  Recommend clinical correlation and follow-up imaging is warranted.  2. Nonvisualization of the ovaries. No free fluid in the pelvis.    FLETCHER DELCID MD                Clinical Quality Measure: Blood Pressure Screening     Your blood pressure was checked while you were in the emergency department today. The last reading we obtained was  BP: 152/84 . Please read the guidelines below about what these numbers mean and what you should do about them.  If your systolic blood pressure (the top number) is less than 120 and your diastolic blood pressure (the bottom number) is less than 80, then your blood pressure is normal. There is nothing more that you need to do about it.  If your systolic blood pressure (the top number) is 120-139 or your diastolic blood pressure (the bottom number) is 80-89,  "your blood pressure may be higher than it should be. You should have your blood pressure rechecked within a year by a primary care provider.  If your systolic blood pressure (the top number) is 140 or greater or your diastolic blood pressure (the bottom number) is 90 or greater, you may have high blood pressure. High blood pressure is treatable, but if left untreated over time it can put you at risk for heart attack, stroke, or kidney failure. You should have your blood pressure rechecked by a primary care provider within the next 4 weeks.  If your provider in the emergency department today gave you specific instructions to follow-up with your doctor or provider even sooner than that, you should follow that instruction and not wait for up to 4 weeks for your follow-up visit.        Thank you for choosing [facility]       Thank you for choosing [facility] for your care. Our goal is always to provide you with excellent care. Hearing back from our patients is one way we can continue to improve our services. Please take a few minutes to complete the written survey that you may receive in the mail after you visit with us. Thank you!        [employer] Information     [employer] lets you send messages to your doctor, view your test results, renew your prescriptions, schedule appointments and more. To sign up, go to [email or web address] . Click on \"Log in\" on the left side of the screen, which will take you to the Welcome page. Then click on \"Sign up Now\" on the right side of the page.     You will be asked to enter the access code listed below, as well as some personal information. Please follow the directions to create your username and password.     Your access code is: [email or web address]  Expires: 2017  8:31 AM     Your access code will  in 90 days. If you need help or a new code, please call your [facility] clinic or [phone].        Care EveryWhere ID     This is your Care EveryWhere ID. This could be used by other " organizations to access your [place] medical records  [id number]        After Visit Summary       This is your record. Keep this with you and show to your community pharmacist(s) and doctor(s) at your next visit.

## 2017-03-30 NOTE — ED NOTES
Arrives with vaginal bleeding. Apporx 6 wks pregnant bleeding and cramping started 2 hrs hrs blood is red saturated 3-4 pads since start. A/ox 3. VS stable.

## 2017-03-30 NOTE — ED AVS SNAPSHOT
[facility] Emergency Department    [address]    [facility] [address]    Phone:  [phone]    Fax:  [phone]                                       Shannan Savage   MRN: [id number]    Department:  [facility] Emergency Department   Date of Visit:  3/30/2017           After Visit Summary Signature Page     I have received my discharge instructions, and my questions have been answered. I have discussed any challenges I see with this plan with the nurse or doctor.    ..........................................................................................................................................  Patient/Patient Representative Signature      ..........................................................................................................................................  Patient Representative Print Name and Relationship to Patient    ..................................................               ................................................  Date                                            Time    ..........................................................................................................................................  Reviewed by Signature/Title    ...................................................              ..............................................  Date                                                            Time

## 2017-03-30 NOTE — ED PROVIDER NOTES
History     Chief Complaint:  Vaginal Bleeding    HPI   Shannan Savage is a 31 year old female who presents with vaginal bleeding. The patient reports that she went to Urgent Care six days ago because her period was 7-8 days late (last LMP 2/19/2017). Despite the reported use of birth control and condoms, the patient was found to be pregnant. When she found this out, she stopped taking all of her regular medications and made an appointment with her obstetrician to determine which of her medications were safe for pregnancy, and alternatives for those that are not. This afternoon, the patient went to the bathroom at which point she realized that her underwear were soaked with blood. She then changed her underwear and put on a pad, and has since gone through four pads in the last two hours. She reports associated 8/10 lower abdominal cramping and lightheadedness/dizziness. The patient denies fevers, chills, chest pain, shortness of breath, nausea, vomiting, diarrhea, constipation, or urinary symptoms. She does not have a history of abnormal bleeding or bruising and is not currently on blood thinners. Of note, the patient states that she had gonorrhea and chlamydia testing done last week for which she received report of negative results.     Allergies:  Codeine  Latex      Medications:  Tylenol     Past Medical History:    Morbid obesity  Prediabetes  Bipolar 1 disorder  Migraines  Anxiety  Depression  Blood clots in legs     Past Surgical History:    GI surgery    Family History:    Mesothelioma    Social History:  Relationship status: Single  Tobacco use: Negative  Alcohol use: Negative  The patient presents alone.      Review of Systems   Constitutional: Negative for chills and fever.   Respiratory: Negative for shortness of breath.    Cardiovascular: Negative for chest pain.   Gastrointestinal: Positive for abdominal pain. Negative for constipation, diarrhea, nausea and vomiting.   Genitourinary: Positive for  vaginal bleeding. Negative for difficulty urinating, dysuria, frequency, hematuria and urgency.   Neurological: Positive for dizziness and light-headedness.   Hematological: Does not bruise/bleed easily.   All other systems reviewed and are negative.    Physical Exam   First Vitals:  BP: (!) 171/110  Pulse: 103  Temp: 97.8  F (36.6  C)  Resp: 18  SpO2: 98 %    Physical Exam   GEN: Large adult female, sitting upright  EYES: PERRL, conjunctiva normal  ENT: Moist mucous membranes. Oropharynx clear. No exudate or lesions.  CV: Normal S1S2. Regular rate and rhythm. No murmurs, rubs, or gallops.  RESP: Clear to auscultation bilaterally. Normal effort. No wheezes, rales, or rhonchi.  GI: Abdomen is soft, nontender and nondistended. No palpable masses. No rebound or guarding.   GYN: Normal appearing external genitalia. Trace amount of dark red blood coming from a closed cervical os. Unable to perform full bimanual examination due to body habitus.  MSK: Ambulatory. Moves all extremities normally.   Skin: Warm and dry. No rashes, lesions or ecchymoses. No petechiae.  Neuro: Alert and oriented.  Responds appropriately to all questions and commands. No focal abnormalities appreciated.  Psych: Normal affect. Pleasant.     Emergency Department Course   Imaging:  Radiographic findings were communicated with the patient who voiced understanding of the findings.    OB US, <14 Weeks, with transvaginal, per radiology:   1. Ill-defined convex hypoechoic collection in the endometrium, possibly a gestational sac. No fetal pole or heart rate is detectable. Recommend clinical correlation and follow-up imaging is warranted.  2. Nonvisualization of the ovaries. No free fluid in the pelvis.     Laboratory:  CBC: WBC 5.3, HGB 12.0,   HCG Quantitative: 4687 (H)   Wet Prep: Few PMN's seen; No yeast seen; No clue cells seen; No Trichomonas seen  UA: Cloudy, jose rafael urine: Large Blood (A), 100 Albumin (A), Trace Leukocyte Esterase (A), 6 WBC  (H), >182 RBC (H), Few Bacteria (A), 14 Squamous Epithelial (H), Mucous Present (A), o/w WNL    Interventions:  : Normal Saline, 1000 mL, IV  : Morphine, 4 mg, IV injection    Emergency Department Course:  Nursing notes and vitals reviewed.  I performed an exam of the patient as documented above.  The above workup was undertaken.  : I performed a chaperoned pelvic exam as documented above.   : I rechecked the patient and discussed results. She denies any new concerns. Is feeling more comfortable.  Findings and plan explained to the Patient. Patient discharged home, status improved, with instructions regarding supportive care, medications, and reasons to return as well as the importance of close follow-up was reviewed.    Impression & Plan    Medical Decision Making:  Shannan Savaeg is a 31 year old female  approximately 5w4d pregnant by dates who presents to the ER with concerns for vaginal bleeding and pelvic cramping. There is no significant bleeding on vaginal examination with only a tiny amount of dark blood coming form a closed cervical os. She did not have any tenderness on my examination. She has evidence of possible gestational sac in the uterus. Her serum HCG is positive but the quantitative value is relatively low. Her overall presentation would be concerning for possible threatened miscarriage, especially giving the amount of bleeding she is having. This is not definite though and she should follow up with her obstetrician within 2-3 days for repeat serum HCG as well as reassessment. She was given appropriate precautions at home and return instructions. All questions were answered prior to discharge.     Diagnosis:    ICD-10-CM   1. Vaginal bleeding in pregnancy, first trimester O46.91   2. Abdominal cramping R10.9   3. Dizziness R42     Disposition:  Discharge home with OBGYN follow up.       Dasha LORA, am serving as a scribe on 3/30/2017 at 6:05 PM to personally  document services performed by Steph Deutsch MD, based on my observations and the provider's statements to me.    [facility] EMERGENCY DEPARTMENT     Steph Deutsch MD  04/01/17 [id number]

## 2017-03-31 NOTE — DISCHARGE INSTRUCTIONS
Discharge Instructions  Vaginal Bleeding in Pregnancy    Bleeding in early pregnancy can be a sign of a miscarriage in process or an abnormal pregnancy, but often is innocent and the pregnancy will continue normally. We may do blood pregnancy tests and ultrasound to try to determine what is causing the bleeding in your case, but sometimes we can't tell and need to follow you with time, more blood tests, and another ultrasound.     Return to the Emergency Department if:    You have severe abdominal or pelvic pain.    You faint, or feel lightheaded or dizzy.     Your bleeding is gets much worse, and is heavier than a heavy period or if you pass any blood clots larger than a quarter.    You pass any tissue--solid material that doesn't appear smooth and even like a blood clot. If you pass tissue, save it (even if you have to pull it out of the toilet) and put it in a plastic bag or jar and bring it in.      You have a fever of 100.5 degrees or higher.  If no pregnancy could be seen:    It may be that everything is normal and it is just too early to see the pregnancy, but you could have an ectopic pregnancy, which is a pregnancy in an abnormal location, such as in the tube. An ectopic pregnancy can cause severe internal bleeding or death.    You need to be seen by your regular doctor, or an OB/GYN doctor, in 48-72 hours for a repeat blood pregnancy test.    You should not be alone, in case you suddenly become very sick.     You should not have sex or put anything in your vagina.     If a pregnancy was seen in your uterus:    If a heartbeat could be seen, the chance of miscarriage is much lower.    You need to see your regular doctor, or an OB/GYN doctor, within 2-3 days.    You should not have sex or put anything in your vagina.     Facts about miscarriage: We hope you don't have a miscarriage, but if you do, here are important things to know:    Early miscarriage is very common, and having one miscarriage doesn't mean  you will have problems with another pregnancy.    Nothing you did caused it. Taking medicine, drinking alcohol, having sex, exercising, or falling down won't cause a miscarriage.     If you were given a prescription for medicine here today, be sure to read all of the information (including the package insert) that comes with your prescription.  This will include important information about the medicine, its side effects, and any warnings that you need to know about.  The pharmacist who fills the prescription can provide more information and answer questions you may have about the medicine.  If you have questions or concerns that the pharmacist cannot address, please call or return to the Emergency Department.     Remember that you can always come back to the Emergency Department if you are not able to see your regular doctor in the amount of time listed above, if you get any new symptoms, or if there is anything that worries you.

## 2017-04-01 ENCOUNTER — APPOINTMENT (OUTPATIENT)
Dept: ULTRASOUND IMAGING | Facility: CLINIC | Age: 32
End: 2017-04-01
Attending: EMERGENCY MEDICINE
Payer: COMMERCIAL

## 2017-04-01 ENCOUNTER — HOSPITAL ENCOUNTER (EMERGENCY)
Facility: CLINIC | Age: 32
Discharge: HOME | End: 2017-04-01
Attending: EMERGENCY MEDICINE | Admitting: EMERGENCY MEDICINE
Payer: COMMERCIAL

## 2017-04-01 VITALS
DIASTOLIC BLOOD PRESSURE: 82 MMHG | WEIGHT: 293 LBS | RESPIRATION RATE: 20 BRPM | BODY MASS INDEX: 41.95 KG/M2 | OXYGEN SATURATION: 97 % | HEIGHT: 70 IN | SYSTOLIC BLOOD PRESSURE: 137 MMHG | HEART RATE: 99 BPM | TEMPERATURE: 98.9 F

## 2017-04-01 DIAGNOSIS — R07.89 ATYPICAL CHEST PAIN: ICD-10-CM

## 2017-04-01 DIAGNOSIS — Z34.90 EARLY STAGE OF PREGNANCY: ICD-10-CM

## 2017-04-01 LAB
ALBUMIN SERPL-MCNC: 2.9 G/DL (ref 3.4–5)
ALP SERPL-CCNC: 47 U/L (ref 40–150)
ALT SERPL W P-5'-P-CCNC: 25 U/L (ref 0–50)
ANION GAP SERPL CALCULATED.3IONS-SCNC: 8 MMOL/L (ref 3–14)
AST SERPL W P-5'-P-CCNC: 20 U/L (ref 0–45)
B-HCG SERPL-ACNC: 7287 IU/L (ref 0–5)
BASOPHILS # BLD AUTO: 0.1 10E9/L (ref 0–0.2)
BASOPHILS NFR BLD AUTO: 0.9 %
BILIRUB SERPL-MCNC: 0.3 MG/DL (ref 0.2–1.3)
BUN SERPL-MCNC: 9 MG/DL (ref 7–30)
CALCIUM SERPL-MCNC: 8.4 MG/DL (ref 8.5–10.1)
CHLORIDE SERPL-SCNC: 104 MMOL/L (ref 94–109)
CO2 SERPL-SCNC: 28 MMOL/L (ref 20–32)
CREAT SERPL-MCNC: 0.68 MG/DL (ref 0.52–1.04)
D DIMER PPP FEU-MCNC: 1.1 UG/ML FEU (ref 0–0.5)
DIFFERENTIAL METHOD BLD: ABNORMAL
EOSINOPHIL # BLD AUTO: 0.1 10E9/L (ref 0–0.7)
EOSINOPHIL NFR BLD AUTO: 1.4 %
ERYTHROCYTE [DISTWIDTH] IN BLOOD BY AUTOMATED COUNT: 16.6 % (ref 10–15)
GFR SERPL CREATININE-BSD FRML MDRD: ABNORMAL ML/MIN/1.7M2
GLUCOSE SERPL-MCNC: 132 MG/DL (ref 70–99)
HCT VFR BLD AUTO: 37.3 % (ref 35–47)
HGB BLD-MCNC: 11.3 G/DL (ref 11.7–15.7)
IMM GRANULOCYTES # BLD: 0 10E9/L (ref 0–0.4)
IMM GRANULOCYTES NFR BLD: 0.3 %
LYMPHOCYTES # BLD AUTO: 2 10E9/L (ref 0.8–5.3)
LYMPHOCYTES NFR BLD AUTO: 31 %
MCH RBC QN AUTO: 24.6 PG (ref 26.5–33)
MCHC RBC AUTO-ENTMCNC: 30.3 G/DL (ref 31.5–36.5)
MCV RBC AUTO: 81 FL (ref 78–100)
MONOCYTES # BLD AUTO: 0.4 10E9/L (ref 0–1.3)
MONOCYTES NFR BLD AUTO: 5.8 %
NEUTROPHILS # BLD AUTO: 4 10E9/L (ref 1.6–8.3)
NEUTROPHILS NFR BLD AUTO: 60.6 %
NRBC # BLD AUTO: 0 10*3/UL
NRBC BLD AUTO-RTO: 0 /100
PLATELET # BLD AUTO: 222 10E9/L (ref 150–450)
POTASSIUM SERPL-SCNC: 3.8 MMOL/L (ref 3.4–5.3)
PROT SERPL-MCNC: 6.4 G/DL (ref 6.8–8.8)
RBC # BLD AUTO: 4.6 10E12/L (ref 3.8–5.2)
SODIUM SERPL-SCNC: 140 MMOL/L (ref 133–144)
WBC # BLD AUTO: 6.5 10E9/L (ref 4–11)

## 2017-04-01 PROCEDURE — 25000132 ZZH RX MED GY IP 250 OP 250 PS 637

## 2017-04-01 PROCEDURE — 93970 EXTREMITY STUDY: CPT

## 2017-04-01 PROCEDURE — 99285 EMERGENCY DEPT VISIT HI MDM: CPT | Mod: 25

## 2017-04-01 PROCEDURE — 80053 COMPREHEN METABOLIC PANEL: CPT | Performed by: EMERGENCY MEDICINE

## 2017-04-01 PROCEDURE — 93005 ELECTROCARDIOGRAM TRACING: CPT

## 2017-04-01 PROCEDURE — 76801 OB US < 14 WKS SINGLE FETUS: CPT

## 2017-04-01 PROCEDURE — 85025 COMPLETE CBC W/AUTO DIFF WBC: CPT | Performed by: EMERGENCY MEDICINE

## 2017-04-01 PROCEDURE — 84702 CHORIONIC GONADOTROPIN TEST: CPT | Performed by: EMERGENCY MEDICINE

## 2017-04-01 PROCEDURE — 85379 FIBRIN DEGRADATION QUANT: CPT | Performed by: EMERGENCY MEDICINE

## 2017-04-01 RX ORDER — ACETAMINOPHEN 500 MG
TABLET ORAL
Status: COMPLETED
Start: 2017-04-01 | End: 2017-04-01

## 2017-04-01 RX ADMIN — ACETAMINOPHEN 1000 MG: 500 TABLET, FILM COATED ORAL at 11:04

## 2017-04-01 ASSESSMENT — ENCOUNTER SYMPTOMS: ABDOMINAL PAIN: 1

## 2017-04-01 NOTE — ED AVS SNAPSHOT
[facility] Emergency Department    [address]    [facility] [address]    Phone:  [phone]    Fax:  [phone]                                       Shannan Savage   MRN: [id number]    Department:  [facility] Emergency Department   Date of Visit:  4/1/2017           After Visit Summary Signature Page     I have received my discharge instructions, and my questions have been answered. I have discussed any challenges I see with this plan with the nurse or doctor.    ..........................................................................................................................................  Patient/Patient Representative Signature      ..........................................................................................................................................  Patient Representative Print Name and Relationship to Patient    ..................................................               ................................................  Date                                            Time    ..........................................................................................................................................  Reviewed by Signature/Title    ...................................................              ..............................................  Date                                                            Time

## 2017-04-01 NOTE — ED PROVIDER NOTES
"  History     Chief Complaint:  Shortness of Breath and Chest Pain    HPI   Shannan Savage is a 31 year old female, currently 6 weeks pregnant, who presents with shortness of breath and chest pain. The patient was seen at this ED on 3/30 with abdominal pain and vaginal bleeding. This morning at 0820, she developed mid chest pain, which she describes as \"stabbing\". It feels to her as though her heart is \"flickering\", but is not similar to previous chest pain she has experienced. Patient reports feeling short of breath, and it is taking her longer than usual to breath.     The patient has had continued vaginal bleeding since her visit on 3/30. She thinks she has seen blood clots. Has continued to have significant abdominal cramping, for which she has taken tylenol. She has a history of blood clots in both legs, both of which were 6-7 years ago. Patient is pregnant for the first time. Denies new leg swelling, and she has not had similar symptoms to her prior blood clots.    Cardiac Risk Factors:  CAD:    Neg  Hypertension:   Neg  Hyperlipidemia:  Neg  Diabetes:   Pre-diabetic  Tobacco use:   Neg  Gender:   F  Age:    31  Familial Hx of CAD:  Neg    Allergies:  Codeine     Medications:    Tylenol  Benadryl  Citalopram hydrobromide  Flexeril  [place]  Imitrex    Past Medical History:    Bipolar 1 disorder  Morbid obesity  Prediabetes  Migraines    Past Surgical History:    Cholecystectomy    Family History:    Mesothelioma    Social History:  Relationship status: Single  Tobacco use: Negative  Alcohol use: Negative  The patient presents with a friend.     Review of Systems   Cardiovascular: Positive for chest pain. Negative for leg swelling.   Gastrointestinal: Positive for abdominal pain.   All other systems reviewed and are negative.      Physical Exam   First Vitals:  BP: (!) 145/110  Pulse: 99  Heart Rate: 99  Temp: 98.9  F (37.2  C)  Resp: 16  Height: 177.8 cm (5' 10\")  Weight: (!) 211.4 kg (466 lb)  SpO2: 97 " %    Physical Exam  Vital signs and nursing notes reviewed.     Constitutional: laying on gurney appears mildly  uncomfortable  HENT: Oropharynx is clear and moist  Eyes: Conjunctivae are normal bilaterally. Pupils equal  Neck: normal range of motion  Cardiovascular: Normal rate, regular rhythm, normal heart sounds.   Pulmonary/Chest: Effort normal and breath sounds normal. No respiratory distress. Lung sounds clear bilaterally. No wheezing, no cough, and appears to be breathing comfortably.   Abdominal: Soft. Bowel sounds are normal. No tenderness to palpation. No rebound or guarding.   Musculoskeletal: No joint swelling or edema.   Neurological: Alert and oriented. No focal weakness. No reported calf pain or signs of unilateral leg swelling.  Skin: Skin is warm and dry. No rash noted.   Psych: normal affect      Emergency Department Course   ECG (9:33:00):  Indication: Chest pain and shortness of breath.   Rate 85 bpm. NV interval 142. QRS duration 80. QT/QTc 332/395. P-R-T axes 47.   Interpretation: Normal sinus rhythm with sinus arrhythmia  Agree with computer interpretation.  Interpreted at 0935 by Dr. Jacobs.    Imaging:  Radiographic findings were communicated with the patient who voiced understanding of the findings.    OB US, 1st trimester, w transvaginal, per radiology:   1. Single intrauterine pregnancy with an estimated age of 7 weeks 1 day. Nonvisualization of the fetal pole. No fetal heart rate detected. Recommend clinical correlation.  2. 1.0 cm subchorionic hemorrhage with free fluid in the right adnexa.    Bilateral Lower Extremity US, Venous Duplex, per radiology:   Negative    Laboratory:  CBC: WBC 6.5, HGB 11.3 (L),   CMP: Glucose 132 (H), Calcium 8.4 (L), Albumin 2.9 (L), Protein 6.4 (L), o/w WNL (Creatinine 0.68)  D dimer: 1.1 (H)  HCG Quantitative blood: 7287 (H)    Interventions:  1104: Tylenol, 1000 mg, PO    Emergency Department Course:  Nursing notes and vitals reviewed.  I  performed an exam of the patient as documented above.  The above workup was undertaken.  1033: I rechecked the patient and discussed results.  1122: I rechecked the patient.  1229: I rechecked the patient.    Findings and plan explained to the Patient. Patient discharged home, status improved, with instructions regarding supportive care, medications, and reasons to return as well as the importance of close follow-up was reviewed.      Impression & Plan      Medical Decision Making:  Shannan Savage is a 31 year old female who presents with vague, sternal chest discomfort as well as a feeling of dyspnea. Patient is approximately 5-7 weeks pregnant. She does have a history of DVT in the past, thought to be due to hormone replacement therapy.  She declines having any pleuritic symptoms or cough.  Her exam shows normal vitals.   She has no signs of dyspnea, pleuritic pain, or obvious cardiopulmonary cause of her symptoms. However, she does have elevated D-dimer which is unclear if this is falsely elevated due to pregnancy or not. I did do bilateral lower extremity ultrasounds, which were negative for DVT, as well as repeated a pelvic ultrasound which showed a intrauterine gestational a sac at approximately 7 weeks, but no fetal heart beat or pole. It is unclear if this is a blighted ovum, or if this is still early in pregnancy. There is no indication there is ectopic pregnancy. Her HCG quant levels are increasing appropriately based on testing done two days ago. I had a long discussion with patient about evaluation with CT scan with chest imaging to rule out PE.  I discussed risks of missing PE vs risks of radiation exposure to a potentially growing fetus, especially in very early pregnancy. She understands the risks of missing diagnosis, as well as risks of potential exposure to fetus and she elects not to have CT scan at this time. My belief is she highly unlikely has a PE based on her symptoms, lack of DVT, and  vital signs. She is aware however, that if she develops more chest pain, shortness of breath, she is to return for re-evaluation, or if there are any other concerns. Patient understands the plan, and is discharged home to follow up with Ob-Gyn in 24-48 hours.     Diagnosis:    ICD-10-CM   1. Atypical chest pain R07.89   2. Early stage of pregnancy Z33.1     Disposition:  Discharge to home with primary care follow up.    ICaleb, am serving as a scribe on 4/1/2017 at 9:43 AM to personally document services performed by Gary Jacobs MD, based on my observations and the provider's statements to me.     [facility] EMERGENCY DEPARTMENT       Gary Jacobs MD  04/02/17 [id number]

## 2017-04-01 NOTE — DISCHARGE INSTRUCTIONS
*CHEST PAIN, UNCERTAIN CAUSE    Based on your exam today, the exact cause of your chest pain is not certain. Your condition does not seem serious at this time, and your pain does not appear to be coming from your heart. However, sometimes the signs of a serious problem take more time to appear. Therefore, watch for the warning signs listed below.  HOME CARE:  1. Rest today and avoid strenuous activity.  2. Take any prescribed medicine as directed.  FOLLOW UP with your doctor in 1-3 days.   GET PROMPT MEDICAL ATTENTION if any of the following occur:    A change in the type of pain: if it feels different, becomes more severe, lasts longer, or begins to spread into your shoulder, arm, neck, jaw or back    Shortness of breath or increased pain with breathing    Weakness, dizziness, or fainting    Cough with blood or dark colored sputum (phlegm)    Fever over 101  F (38.3  C)    Swelling, pain or redness in one leg    [id number] [address], [place] [address]. All rights reserved. This information is not intended as a substitute for professional medical care. Always follow your healthcare professional's instructions.

## 2017-04-01 NOTE — ED AVS SNAPSHOT
[facility] Emergency Department    [address]    [facility] [address]    Phone:  [phone]    Fax:  [phone]                                       Shannan Savage   MRN: [id number]    Department:  [facility] Emergency Department   Date of Visit:  4/1/2017           Patient Information     Date Of Birth          1985        Your diagnoses for this visit were:     Atypical chest pain     Early stage of pregnancy        You were seen by Gary Jacobs MD.      Follow-up Information     Follow up with OB/Gyn.    Why:  in 2-3 days        Follow up with [facility] Emergency Department.    Specialty:  EMERGENCY MEDICINE    Why:  if increased chest pain or shortness of breath    Contact information:    [address]  [facility] [id number] [phone]        Discharge Instructions          *CHEST PAIN, UNCERTAIN CAUSE    Based on your exam today, the exact cause of your chest pain is not certain. Your condition does not seem serious at this time, and your pain does not appear to be coming from your heart. However, sometimes the signs of a serious problem take more time to appear. Therefore, watch for the warning signs listed below.  HOME CARE:  1. Rest today and avoid strenuous activity.  2. Take any prescribed medicine as directed.  FOLLOW UP with your doctor in 1-3 days.   GET PROMPT MEDICAL ATTENTION if any of the following occur:    A change in the type of pain: if it feels different, becomes more severe, lasts longer, or begins to spread into your shoulder, arm, neck, jaw or back    Shortness of breath or increased pain with breathing    Weakness, dizziness, or fainting    Cough with blood or dark colored sputum (phlegm)    Fever over 101  F (38.3  C)    Swelling, pain or redness in one leg    [id number] [employer] [facility], [address] [place] [address], [place] [address]. All rights reserved. This information is not intended as a  substitute for professional medical care. Always follow your healthcare professional's instructions.      Discharge References/Attachments     PREGNANCY, BLEEDING DURING EARLY (ENGLISH)      Future Appointments        Provider Department Dept Phone Center    4/6/2017 9:00 AM [facility] [phone] [place]    4/27/2017 1:00 PM Ann Reid,  [facility] [phone] [place]      24 Hour Appointment Hotline       To make an appointment at any [facility], call [phone] ([phone]). If you don't have a family doctor or clinic, we will help you find one. [facility] are conveniently located to serve the needs of you and your family.             Review of your medicines      Our records show that you are taking the medicines listed below. If these are incorrect, please call your family doctor or clinic.        Dose / Directions Last dose taken    BENADRYL PO        Take by mouth daily as needed   Refills:  0        CITALOPRAM HYDROBROMIDE PO   Dose:  20 mg        Take 20 mg by mouth 2 times daily   Refills:  0        FLEXERIL PO   Dose:  5 mg        Take 5 mg by mouth   Refills:  0        HYDROcodone-acetaminophen 5-325 MG per tablet   Commonly known as:  NORCO   Dose:  1 tablet        Take 1 tablet by mouth every 6 hours as needed for moderate to severe pain   Refills:  0        SUMAtriptan Succinate Refill 6 MG/0.5ML Soct   Commonly known as:  IMITREX        Inject Subcutaneous once   Refills:  0        TYLENOL PO   Dose:  325 mg        Take 325 mg by mouth   Refills:  0        VITAMIN D (CHOLECALCIFEROL) PO   Dose:  38101 Units        Take 50,000 Units by mouth daily   Refills:  0                Procedures and tests performed during your visit     CBC + differential    Comprehensive metabolic panel    D dimer quantitative    EKG 12 lead    HCG QUANTitative pregnancy (blood)    IV access    OB  US 1st trimester w transvag    US Lower Extremity  Venous Duplex Bilateral      Orders Needing Specimen Collection     None      Pending Results     Date and Time Order Name Status Description    4/1/2017 0938 EKG 12 lead Preliminary             Pending Culture Results     No orders found from 3/30/2017 to 4/2/2017.             Test Results from your hospital stay     4/1/2017 10:35 AM - Interface, Flexilab Results      Component Results     Component Value Ref Range & Units Status    WBC 6.5 4.0 - 11.0 10e9/L Final    RBC Count 4.60 3.8 - 5.2 10e12/L Final    Hemoglobin 11.3 (L) 11.7 - 15.7 g/dL Final    Hematocrit 37.3 35.0 - 47.0 % Final    MCV 81 78 - 100 fl Final    MCH 24.6 (L) 26.5 - 33.0 pg Final    MCHC 30.3 (L) 31.5 - 36.5 g/dL Final    RDW 16.6 (H) 10.0 - 15.0 % Final    Platelet Count 222 150 - 450 10e9/L Final    Diff Method Automated Method  Final    % Neutrophils 60.6 % Final    % Lymphocytes 31.0 % Final    % Monocytes 5.8 % Final    % Eosinophils 1.4 % Final    % Basophils 0.9 % Final    % Immature Granulocytes 0.3 % Final    Nucleated RBCs 0 0 /100 Final    Absolute Neutrophil 4.0 1.6 - 8.3 10e9/L Final    Absolute Lymphocytes 2.0 0.8 - 5.3 10e9/L Final    Absolute Monocytes 0.4 0.0 - 1.3 10e9/L Final    Absolute Eosinophils 0.1 0.0 - 0.7 10e9/L Final    Absolute Basophils 0.1 0.0 - 0.2 10e9/L Final    Abs Immature Granulocytes 0.0 0 - 0.4 10e9/L Final    Absolute Nucleated RBC 0.0  Final         4/1/2017 11:08 AM - Interface, Flexilab Results      Component Results     Component Value Ref Range & Units Status    HCG Quantitative Serum 7287 (H) 0 - 5 IU/L Final    Specimen run with a dilution         4/1/2017 10:55 AM - Interface, Flexilab Results      Component Results     Component Value Ref Range & Units Status    Sodium 140 133 - 144 mmol/L Final    Potassium 3.8 3.4 - 5.3 mmol/L Final    Chloride 104 94 - 109 mmol/L Final    Carbon Dioxide 28 20 - 32 mmol/L Final    Anion Gap 8 3 - 14 mmol/L Final    Glucose 132 (H) 70 - 99 mg/dL Final    Urea  Nitrogen 9 7 - 30 mg/dL Final    Creatinine 0.68 0.52 - 1.04 mg/dL Final    GFR Estimate >90  Non  GFR Calc   >60 mL/min/1.7m2 Final    GFR Estimate If Black >90   GFR Calc   >60 mL/min/1.7m2 Final    Calcium 8.4 (L) 8.5 - 10.1 mg/dL Final    Bilirubin Total 0.3 0.2 - 1.3 mg/dL Final    Albumin 2.9 (L) 3.4 - 5.0 g/dL Final    Protein Total 6.4 (L) 6.8 - 8.8 g/dL Final    Alkaline Phosphatase 47 40 - 150 U/L Final    ALT 25 0 - 50 U/L Final    AST 20 0 - 45 U/L Final         4/1/2017 12:18 PM - Interface, Radiant Ib      Narrative     US LOWER EXTREMITY VENOUS DUPLEX BILATERAL 4/1/2017 12:15 PM    HISTORY: Chest pain.    TECHNIQUE: Imaged deep venous structures of each lower extremity  include the common femoral veins, superficial femoral veins, popliteal  veins, and visualized posterior deep calf veins.  Color flow and  spectral Doppler with waveform analysis performed.    COMPARISON: None.    FINDINGS: No DVT is demonstrated in either lower extremity.        Impression     IMPRESSION: Negative.    FLETCHER DELCID MD         4/1/2017 10:48 AM - Interface, Flexilab Results      Component Results     Component Value Ref Range & Units Status    D Dimer 1.1 (H) 0.0 - 0.50 ug/ml FEU Final    This D-dimer assay is intended for use in conjuntion with a clinical pretest   probability assessment model to exclude pulmonary embolism (PE) and as an aid   in the diagnosis of deep venous thrombosis (DVT) in outpatients suspected of   PE   or DVT. The cut-off value is 0.5 g/mL FEU.           4/1/2017 12:21 PM - Interface, Radiant Ib      Narrative     US OB <14 WEEKS WITH TRANSVAGINAL SINGLE 4/1/2017 12:15 PM    CLINICAL HISTORY: Abdominal pain.      TECHNIQUE: Transabdominal and transvaginal scan is performed.     COMPARISON: None.     LMP: February 19, 2017.     FINDINGS: There is a single intrauterine pregnancy. Mean gestational  sac diameter is 10 mm, corresponding with a gestational age of 7  weeks  1 day. No fetal pole or fetal heart rate is seen.    There is a 1.0 cm subchorionic hemorrhage. There is free fluid in the  right adnexa. The ovaries are not visualized.        Impression     IMPRESSION:   1. Single intrauterine pregnancy with an estimated age of 7 weeks 1  day. Nonvisualization of the fetal pole. No fetal heart rate detected.  Recommend clinical correlation.  2. 1.0 cm subchorionic hemorrhage with free fluid in the right adnexa.    FLETCHER DELCID MD                Clinical Quality Measure: Blood Pressure Screening     Your blood pressure was checked while you were in the emergency department today. The last reading we obtained was  BP: 153/89 . Please read the guidelines below about what these numbers mean and what you should do about them.  If your systolic blood pressure (the top number) is less than 120 and your diastolic blood pressure (the bottom number) is less than 80, then your blood pressure is normal. There is nothing more that you need to do about it.  If your systolic blood pressure (the top number) is 120-139 or your diastolic blood pressure (the bottom number) is 80-89, your blood pressure may be higher than it should be. You should have your blood pressure rechecked within a year by a primary care provider.  If your systolic blood pressure (the top number) is 140 or greater or your diastolic blood pressure (the bottom number) is 90 or greater, you may have high blood pressure. High blood pressure is treatable, but if left untreated over time it can put you at risk for heart attack, stroke, or kidney failure. You should have your blood pressure rechecked by a primary care provider within the next 4 weeks.  If your provider in the emergency department today gave you specific instructions to follow-up with your doctor or provider even sooner than that, you should follow that instruction and not wait for up to 4 weeks for your follow-up visit.        Thank you for choosing [facility]  "      Thank you for choosing [facility] for your care. Our goal is always to provide you with excellent care. Hearing back from our patients is one way we can continue to improve our services. Please take a few minutes to complete the written survey that you may receive in the mail after you visit with us. Thank you!        [employer] Information     [employer] lets you send messages to your doctor, view your test results, renew your prescriptions, schedule appointments and more. To sign up, go to [email or web address] . Click on \"Log in\" on the left side of the screen, which will take you to the Welcome page. Then click on \"Sign up Now\" on the right side of the page.     You will be asked to enter the access code listed below, as well as some personal information. Please follow the directions to create your username and password.     Your access code is: [email or web address]  Expires: 2017  8:31 AM     Your access code will  in 90 days. If you need help or a new code, please call your [facility] clinic or [phone].        Care EveryWhere ID     This is your Care EveryWhere ID. This could be used by other organizations to access your [facility] medical records  [id number]        After Visit Summary       This is your record. Keep this with you and show to your community pharmacist(s) and doctor(s) at your next visit.                  "

## 2017-04-02 LAB — INTERPRETATION ECG - MUSE: NORMAL

## 2017-06-11 ENCOUNTER — NURSE TRIAGE (OUTPATIENT)
Dept: NURSING | Facility: CLINIC | Age: 32
End: 2017-06-11

## 2017-06-12 ENCOUNTER — HOSPITAL ENCOUNTER (EMERGENCY)
Facility: CLINIC | Age: 32
Discharge: HOME | End: 2017-06-12
Attending: EMERGENCY MEDICINE | Admitting: EMERGENCY MEDICINE
Payer: COMMERCIAL

## 2017-06-12 VITALS
SYSTOLIC BLOOD PRESSURE: 131 MMHG | DIASTOLIC BLOOD PRESSURE: 79 MMHG | HEIGHT: 71 IN | BODY MASS INDEX: 41.02 KG/M2 | TEMPERATURE: 96.8 F | HEART RATE: 90 BPM | OXYGEN SATURATION: 96 % | RESPIRATION RATE: 18 BRPM | WEIGHT: 293 LBS

## 2017-06-12 DIAGNOSIS — K92.2 LOWER GI BLEED: ICD-10-CM

## 2017-06-12 DIAGNOSIS — Z33.1 PREGNANCY, INCIDENTAL: ICD-10-CM

## 2017-06-12 LAB
BASOPHILS # BLD AUTO: 0 10E9/L (ref 0–0.2)
BASOPHILS NFR BLD AUTO: 0.4 %
DIFFERENTIAL METHOD BLD: ABNORMAL
EOSINOPHIL # BLD AUTO: 0.1 10E9/L (ref 0–0.7)
EOSINOPHIL NFR BLD AUTO: 0.9 %
ERYTHROCYTE [DISTWIDTH] IN BLOOD BY AUTOMATED COUNT: 16.5 % (ref 10–15)
HCT VFR BLD AUTO: 37.5 % (ref 35–47)
HEMOCCULT STL QL: NEGATIVE
HGB BLD-MCNC: 11.5 G/DL (ref 11.7–15.7)
IMM GRANULOCYTES # BLD: 0 10E9/L (ref 0–0.4)
IMM GRANULOCYTES NFR BLD: 0.1 %
INR PPP: 1.02 (ref 0.86–1.14)
LYMPHOCYTES # BLD AUTO: 2.1 10E9/L (ref 0.8–5.3)
LYMPHOCYTES NFR BLD AUTO: 31.2 %
MCH RBC QN AUTO: 24.8 PG (ref 26.5–33)
MCHC RBC AUTO-ENTMCNC: 30.7 G/DL (ref 31.5–36.5)
MCV RBC AUTO: 81 FL (ref 78–100)
MONOCYTES # BLD AUTO: 0.4 10E9/L (ref 0–1.3)
MONOCYTES NFR BLD AUTO: 5.4 %
NEUTROPHILS # BLD AUTO: 4.2 10E9/L (ref 1.6–8.3)
NEUTROPHILS NFR BLD AUTO: 62 %
NRBC # BLD AUTO: 0 10*3/UL
NRBC BLD AUTO-RTO: 0 /100
PLATELET # BLD AUTO: 218 10E9/L (ref 150–450)
RBC # BLD AUTO: 4.64 10E12/L (ref 3.8–5.2)
WBC # BLD AUTO: 6.8 10E9/L (ref 4–11)

## 2017-06-12 PROCEDURE — 99283 EMERGENCY DEPT VISIT LOW MDM: CPT

## 2017-06-12 PROCEDURE — 85610 PROTHROMBIN TIME: CPT | Performed by: EMERGENCY MEDICINE

## 2017-06-12 PROCEDURE — 85025 COMPLETE CBC W/AUTO DIFF WBC: CPT | Performed by: EMERGENCY MEDICINE

## 2017-06-12 PROCEDURE — 82272 OCCULT BLD FECES 1-3 TESTS: CPT | Performed by: EMERGENCY MEDICINE

## 2017-06-12 PROCEDURE — 36415 COLL VENOUS BLD VENIPUNCTURE: CPT | Performed by: EMERGENCY MEDICINE

## 2017-06-12 RX ORDER — DOCUSATE SODIUM 100 MG/1
100 CAPSULE, LIQUID FILLED ORAL 3 TIMES DAILY PRN
Qty: 20 CAPSULE | Refills: 0 | Status: SHIPPED | OUTPATIENT
Start: 2017-06-12 | End: 2022-10-09

## 2017-06-12 ASSESSMENT — ENCOUNTER SYMPTOMS
LIGHT-HEADEDNESS: 0
ANAL BLEEDING: 1
SHORTNESS OF BREATH: 0
ABDOMINAL PAIN: 0

## 2017-06-12 NOTE — ED AVS SNAPSHOT
[facility] Emergency Department    [address]    [facility] [address]    Phone:  [phone]    Fax:  [phone]                                       Shannan Savage   MRN: [id number]    Department:  [facility] Emergency Department   Date of Visit:  6/12/2017           After Visit Summary Signature Page     I have received my discharge instructions, and my questions have been answered. I have discussed any challenges I see with this plan with the nurse or doctor.    ..........................................................................................................................................  Patient/Patient Representative Signature      ..........................................................................................................................................  Patient Representative Print Name and Relationship to Patient    ..................................................               ................................................  Date                                            Time    ..........................................................................................................................................  Reviewed by Signature/Title    ...................................................              ..............................................  Date                                                            Time

## 2017-06-12 NOTE — TELEPHONE ENCOUNTER
"  Reason for Disposition    [1] MODERATE rectal bleeding (small blood clots, passing blood without stool, or toilet water turns red) AND [2] more than once a day    Additional Information    Negative: Shock suspected (e.g., cold/pale/clammy skin, too weak to stand, low BP, rapid pulse)    Negative: Difficult to awaken or acting confused  (e.g., disoriented, slurred speech)    Negative: Passed out (i.e., lost consciousness, collapsed and was not responding)    Negative: [1] Vomiting AND [2] contains red blood or black (\"coffee ground\") material  (Exception: few red streaks in vomit that only happened once)    Negative: Sounds like a life-threatening emergency to the triager    Negative: Diarrhea is main symptom    Negative: Stool color other than brown or tan is main concern  (no bleeding and no melena)    Negative: SEVERE rectal bleeding (large blood clots; on and off, or constant bleeding)    Negative: SEVERE dizziness (e.g., unable to stand, requires support to walk, feels like passing out now)    Protocols used: RECTAL BLEEDING-ADULT-  Patient is pregnant and is having dark red rectal bleeding.  "

## 2017-06-12 NOTE — DISCHARGE INSTRUCTIONS
Discharge Instructions  Gastrointestinal Bleeding Not Requiring Admission    You have been seen today because of GI (gastrointestinal) bleeding, bleeding somewhere along your digestive tract.  Most common symptoms are blood in the stool or when you have a bowel movement.  The most common causes of minor GI bleeding are ulcers and hemorrhoids.  Other conditions that cause bleeding include abnormal blood vessels in your GI tract, diverticulosis, inflammatory bowel disease, and cancer.  Fortunately, most of the causes of such bleeding are not life-threatening.      It does not appear that your bleeding is serious enough to require admission to the hospital, but it is very important for you to follow up as instructed.    At your follow up, your regular doctor or GI specialist may order further testing such as:    Blood and stool tests.    Endoscopy and/or colonoscopy, where a tube with a camera is used to look at your digestive tract.    Other very specialized tests depending on your symptoms.    Return to the Emergency Department right away if you:    Develop fever with an oral temperature above 101 F.    Vomit blood or something that looks like coffee grounds.    Have a bowel movement that looks like tar or has a large amount of blood in it.    Feel weak, light-headed, or faint.    Have a racing heartbeat.    Have abdominal pain that is new or increasing.    Have new symptoms that worry you.    What can I do to help myself?    Take any acid reducing medication prescribed by your doctor.    Avoid over the counter medications such as aspirin and Advil , Nuprin  (ibuprofen) that can thin your blood or irritate your stomach, making ulcers worse.  If you were given a prescription for medicine here today, be sure to read all of the information (including the package insert) that comes with your prescription.  This will include important information about the medicine, its side effects, and any warnings that you need to know  about.  The pharmacist who fills the prescription can provide more information and answer questions you may have about the medicine.  If you have questions or concerns that the pharmacist cannot address, please call or return to the Emergency Department.     Opioid Medication Information    Pain medications are among the most commonly prescribed medicines, so we are including this information for all our patients. If you did not receive pain medication or get a prescription for pain medicine, you can ignore it.     You may have been given a prescription for an opioid (narcotic) pain medicine and/or have received a pain medicine while here in the Emergency Department. These medicines can make you drowsy or impaired. You must not drive, operate dangerous equipment, or engage in any other dangerous activities while taking these medications. If you drive while taking these medications, you could be arrested for DUI, or driving under the influence. Do not drink any alcohol while you are taking these medications.     Opioid pain medications can cause addiction. If you have a history of chemical dependency of any type, you are at a higher risk of becoming addicted to pain medications.  Only take these prescribed medications to treat your pain when all other options have been tried. Take it for as short a time and as few doses as possible. Store your pain pills in a secure place, as they are frequently stolen and provide a dangerous opportunity for children or visitors in your house to start abusing these powerful medications. We will not replace any lost or stolen medicine.  As soon as your pain is better, you should flush all your remaining medication.     Many prescription pain medications contain Tylenol  (acetaminophen), including Vicodin , Tylenol #3 , Norco , Lortab , and Percocet .  You should not take any extra pills of Tylenol  if you are using these prescription medications or you can get very sick.  Do not ever  take more than 3000 mg of acetaminophen in any 24 hour period.    All opioids tend to cause constipation. Drink plenty of water and eat foods that have a lot of fiber, such as fruits, vegetables, prune juice, apple juice and high fiber cereal.  Take a laxative if you don t move your bowels at least every other day. Miralax , Milk of Magnesia, Colace , or Senna  can be used to keep you regular.      Remember that you can always come back to the Emergency Department if you are not able to see your regular doctor in the amount of time listed above, if you get any new symptoms, or if there is anything that worries you.

## 2017-06-12 NOTE — ED PROVIDER NOTES
"  History     Chief Complaint:  Rectal Bleeding    HPI   Shannan Savage is a 31 year old female who presents to the emergency department today with her mother and her aunt for evaluation of rectal bleeding. The patient notes that she is currently 17 weeks pregnant. She states that throughout her pregnancy thus far she has been getting morning sickness every morning and also headaches. The patient notes that on Sunday night, 06/11/2017, she woke up at 2320 and went to the restroom to have a bowel movement. She notes that when she wiped, she noticed some blood on the toilet paper. The patient then flushed and had another bowel movement and when she wiped there was no blood on the toilet paper but when she looked into the toilet bowel, she noticed that there were large \"chunks of blood\" and her stool was coated in red blood. The patient then woke up her aunt who is a nurse and her aunt recommended that the patient present to the emergency department. The patient then called the nursing line and was told to come in to the emergency department and bring a sample of her stool. She denies any vaginal bleeding, abdominal pain, shortness of breath, or lightheadedness. She notes that earlier on in her pregnancy, she did have some vaginal bleeding, but since that time she has had no bleeding.    Allergies:  Codeine  Latex    Medications:    Tylenol  Benadryl  Citalopram  Flexeril  Norco  Imitrex  Cholecalciferol     Past Medical History:    Bipolar 1 disorder  Morbid obesity  Prediabetes     Past Surgical History:    GI surgery     Family History:    Father: Mesothelioma     Social History:  The patient was accompanied to the ED by her mother.  Smoking Status: Never Smoker  Smokeless Tobacco: Never Used  Alcohol Use: Negative   Marital Status:  Single [1]     Review of Systems   Respiratory: Negative for shortness of breath.    Gastrointestinal: Positive for anal bleeding. Negative for abdominal pain.   Genitourinary: " "Negative for vaginal bleeding.   Neurological: Negative for light-headedness.   All other systems reviewed and are negative.    Physical Exam   First Vitals:  BP: 166/88  Pulse: 90  Temp: 96.8  F (36  C)  Resp: 18  Height: 180.3 cm (5' 11\")  Weight: (!) 204.1 kg (450 lb)  SpO2: 97 %    Physical Exam  General: The patient is alert, in no respiratory distress.     HENT: Mucous membranes moist.    Cardiovascular: Regular rate and rhythm. Good pulses in all four extremities. Normal capillary refill and skin turgor.     Respiratory: Lungs are clear. No nasal flaring. No retractions. No wheezing, no crackles.    Gastrointestinal: Abdomen soft. No guarding, no rebound. No palpable hernias. Obese. No tenderness.    Musculoskeletal: No gross deformity.     Skin: No rashes or petechiae.     Neurologic: The patient is alert and oriented x3. GCS 15. No testable cranial nerve deficit. Follows commands with clear and appropriate speech. Gives appropriate answers. Good strength in all extremities. No gross neurologic deficit. Gross sensation intact. Pupils are round and reactive. No meningismus.     Lymphatic: No cervical adenopathy. No lower extremity swelling.    Psychiatric: The patient is non-tearful.    Rectal: No visible fissures or gross blood. There are some small hemorrhoids.     Emergency Department Course     Laboratory:  Laboratory findings were communicated with the patient who voiced understanding of the findings.    CBC: WBC 6.8, HGB 11.5 (L),   INR: 1.02  Stool Occult Blood: Negative     Emergency Department Course:  Nursing notes and vitals reviewed.  I performed an exam of the patient as documented above.   IV was inserted and blood was drawn for laboratory testing, results above.  I discussed the treatment plan with the patient. They expressed understanding of this plan and consented to discharge. They will be discharged home with instructions for care and follow up. In addition, the patient will return " to the emergency department if their symptoms persist, worsen, if new symptoms arise or if there is any concern.  All questions were answered.  I personally reviewed the laboratory results with the patient and answered all related questions prior to discharge.  Impression & Plan      Medical Decision Making:  Shannan Savage is a 31 year old female who has a longstanding history of constipation and is pregnant. I felt that the lower GI bleeding was likely either hemorrhoids which were present, or a fissure. She said she had a bowel movement and normally has big stool and the blood was on the outside of these stools and bright red. Here her hemoglobin is adequate and similar to previous. She has a benign abdominal exam and the pregnancy has otherwise been uneventful except for some nausea. She was otherwise stable. She was observed here. There are no signs of a UTI and she was discharged home to close follow up.     Diagnosis:    ICD-10-CM    1. Lower GI bleed K92.2    2. Pregnancy, incidental Z33.1      Disposition:   The patient is discharged to home.    Discharge Medications:  New Prescriptions    DOCUSATE SODIUM (COLACE) 100 MG CAPSULE    Take 1 capsule (100 mg) by mouth 3 times daily as needed for constipation     Scribe Disclosure:  I, Abdias Patel, am serving as a scribe at 1:31 AM on 6/12/2017 to document services personally performed by Cristobal Saavedra MD, based on my observations and the provider's statements to me.    [facility] EMERGENCY DEPARTMENT       Cristobal Saavedra MD  06/12/17 [id number]

## 2017-06-12 NOTE — ED AVS SNAPSHOT
[facility] Emergency Department    [address] [place] [address]    Phone:  [phone]    Fax:  [phone]                                       Shannan Savage   MRN: [id number]    Department:  [facility] Emergency Department   Date of Visit:  6/12/2017           Patient Information     Date Of Birth          1985        Your diagnoses for this visit were:     Lower GI bleed     Pregnancy, incidental        You were seen by Cristobal Saavedra MD.      Follow-up Information     Follow up with Omer Ziegler MD. Schedule an appointment as soon as possible for a visit in 3 days.    Specialty:  Family Practice    Contact information:    [facility]  [address]  [facility] [address]  [phone]          Discharge Instructions       Discharge Instructions  Gastrointestinal Bleeding Not Requiring Admission    You have been seen today because of GI (gastrointestinal) bleeding, bleeding somewhere along your digestive tract.  Most common symptoms are blood in the stool or when you have a bowel movement.  The most common causes of minor GI bleeding are ulcers and hemorrhoids.  Other conditions that cause bleeding include abnormal blood vessels in your GI tract, diverticulosis, inflammatory bowel disease, and cancer.  Fortunately, most of the causes of such bleeding are not life-threatening.      It does not appear that your bleeding is serious enough to require admission to the hospital, but it is very important for you to follow up as instructed.    At your follow up, your regular doctor or GI specialist may order further testing such as:    Blood and stool tests.    Endoscopy and/or colonoscopy, where a tube with a camera is used to look at your digestive tract.    Other very specialized tests depending on your symptoms.    Return to the Emergency Department right away if you:    Develop fever with an oral temperature above 101 F.    Vomit  blood or something that looks like coffee grounds.    Have a bowel movement that looks like tar or has a large amount of blood in it.    Feel weak, light-headed, or faint.    Have a racing heartbeat.    Have abdominal pain that is new or increasing.    Have new symptoms that worry you.    What can I do to help myself?    Take any acid reducing medication prescribed by your doctor.    Avoid over the counter medications such as aspirin and Advil , Nuprin  (ibuprofen) that can thin your blood or irritate your stomach, making ulcers worse.  If you were given a prescription for medicine here today, be sure to read all of the information (including the package insert) that comes with your prescription.  This will include important information about the medicine, its side effects, and any warnings that you need to know about.  The pharmacist who fills the prescription can provide more information and answer questions you may have about the medicine.  If you have questions or concerns that the pharmacist cannot address, please call or return to the Emergency Department.     Opioid Medication Information    Pain medications are among the most commonly prescribed medicines, so we are including this information for all our patients. If you did not receive pain medication or get a prescription for pain medicine, you can ignore it.     You may have been given a prescription for an opioid (narcotic) pain medicine and/or have received a pain medicine while here in the Emergency Department. These medicines can make you drowsy or impaired. You must not drive, operate dangerous equipment, or engage in any other dangerous activities while taking these medications. If you drive while taking these medications, you could be arrested for DUI, or driving under the influence. Do not drink any alcohol while you are taking these medications.     Opioid pain medications can cause addiction. If you have a history of chemical dependency of any  type, you are at a higher risk of becoming addicted to pain medications.  Only take these prescribed medications to treat your pain when all other options have been tried. Take it for as short a time and as few doses as possible. Store your pain pills in a secure place, as they are frequently stolen and provide a dangerous opportunity for children or visitors in your house to start abusing these powerful medications. We will not replace any lost or stolen medicine.  As soon as your pain is better, you should flush all your remaining medication.     Many prescription pain medications contain Tylenol  (acetaminophen), including Vicodin , Tylenol #3 , Norco , Lortab , and Percocet .  You should not take any extra pills of Tylenol  if you are using these prescription medications or you can get very sick.  Do not ever take more than 3000 mg of acetaminophen in any 24 hour period.    All opioids tend to cause constipation. Drink plenty of water and eat foods that have a lot of fiber, such as fruits, vegetables, prune juice, apple juice and high fiber cereal.  Take a laxative if you don t move your bowels at least every other day. Miralax , Milk of Magnesia, Colace , or Senna  can be used to keep you regular.      Remember that you can always come back to the Emergency Department if you are not able to see your regular doctor in the amount of time listed above, if you get any new symptoms, or if there is anything that worries you.      24 Hour Appointment Hotline       To make an appointment at any [facility], call [phone] ([phone]). If you don't have a family doctor or clinic, we will help you find one. [facility] clinics are conveniently located to serve the needs of you and your family.             Review of your medicines      START taking        Dose / Directions Last dose taken    docusate sodium 100 MG capsule   Commonly known as:  COLACE   Dose:  100 mg   Quantity:  20 capsule        Take 1 capsule  (100 mg) by mouth 3 times daily as needed for constipation   Refills:  0          Our records show that you are taking the medicines listed below. If these are incorrect, please call your family doctor or clinic.        Dose / Directions Last dose taken    BENADRYL PO        Take by mouth daily as needed   Refills:  0        CITALOPRAM HYDROBROMIDE PO   Dose:  20 mg        Take 20 mg by mouth 2 times daily   Refills:  0        FLEXERIL PO   Dose:  5 mg        Take 5 mg by mouth   Refills:  0        HYDROcodone-acetaminophen 5-325 MG per tablet   Commonly known as:  NORCO   Dose:  1 tablet        Take 1 tablet by mouth every 6 hours as needed for moderate to severe pain   Refills:  0        SUMAtriptan Succinate Refill 6 MG/0.5ML Soct   Commonly known as:  IMITREX        Inject Subcutaneous once   Refills:  0        TYLENOL PO   Dose:  325 mg        Take 325 mg by mouth   Refills:  0        VITAMIN D (CHOLECALCIFEROL) PO   Dose:  64809 Units        Take 50,000 Units by mouth daily   Refills:  0                Prescriptions were sent or printed at these locations (1 Prescription)                   Other Prescriptions                Printed at Department/Unit printer (1 of 1)         docusate sodium (COLACE) 100 MG capsule                Procedures and tests performed during your visit     CBC with platelets differential    INR    Stool: occult blood      Orders Needing Specimen Collection     None      Pending Results     No orders found from 6/10/2017 to 6/13/2017.            Pending Culture Results     No orders found from 6/10/2017 to 6/13/2017.            Pending Results Instructions     If you had any lab results that were not finalized at the time of your Discharge, you can call the ED Lab Result RN at [phone]. You will be contacted by this team for any positive Lab results or changes in treatment. The nurses are available 7 days a week from 10A to 6:30P.  You can leave a message 24 hours per day and they  will return your call.        Test Results From Your Hospital Stay        6/12/2017  2:38 AM      Component Results     Component Value Ref Range & Units Status    WBC 6.8 4.0 - 11.0 10e9/L Final    RBC Count 4.64 3.8 - 5.2 10e12/L Final    Hemoglobin 11.5 (L) 11.7 - 15.7 g/dL Final    Hematocrit 37.5 35.0 - 47.0 % Final    MCV 81 78 - 100 fl Final    MCH 24.8 (L) 26.5 - 33.0 pg Final    MCHC 30.7 (L) 31.5 - 36.5 g/dL Final    RDW 16.5 (H) 10.0 - 15.0 % Final    Platelet Count 218 150 - 450 10e9/L Final    Diff Method Automated Method  Final    % Neutrophils 62.0 % Final    % Lymphocytes 31.2 % Final    % Monocytes 5.4 % Final    % Eosinophils 0.9 % Final    % Basophils 0.4 % Final    % Immature Granulocytes 0.1 % Final    Nucleated RBCs 0 0 /100 Final    Absolute Neutrophil 4.2 1.6 - 8.3 10e9/L Final    Absolute Lymphocytes 2.1 0.8 - 5.3 10e9/L Final    Absolute Monocytes 0.4 0.0 - 1.3 10e9/L Final    Absolute Eosinophils 0.1 0.0 - 0.7 10e9/L Final    Absolute Basophils 0.0 0.0 - 0.2 10e9/L Final    Abs Immature Granulocytes 0.0 0 - 0.4 10e9/L Final    Absolute Nucleated RBC 0.0  Final         6/12/2017  3:02 AM      Component Results     Component Value Ref Range & Units Status    INR 1.02 0.86 - 1.14 Final         6/12/2017  2:00 AM      Component Results     Component Value Ref Range & Units Status    Occult Blood Negative NEG Final                Clinical Quality Measure: Blood Pressure Screening     Your blood pressure was checked while you were in the emergency department today. The last reading we obtained was  BP: 124/52 . Please read the guidelines below about what these numbers mean and what you should do about them.  If your systolic blood pressure (the top number) is less than 120 and your diastolic blood pressure (the bottom number) is less than 80, then your blood pressure is normal. There is nothing more that you need to do about it.  If your systolic blood pressure (the top number) is 120-139 or  "your diastolic blood pressure (the bottom number) is 80-89, your blood pressure may be higher than it should be. You should have your blood pressure rechecked within a year by a primary care provider.  If your systolic blood pressure (the top number) is 140 or greater or your diastolic blood pressure (the bottom number) is 90 or greater, you may have high blood pressure. High blood pressure is treatable, but if left untreated over time it can put you at risk for heart attack, stroke, or kidney failure. You should have your blood pressure rechecked by a primary care provider within the next 4 weeks.  If your provider in the emergency department today gave you specific instructions to follow-up with your doctor or provider even sooner than that, you should follow that instruction and not wait for up to 4 weeks for your follow-up visit.        Thank you for choosing [facility]       Thank you for choosing [facility] for your care. Our goal is always to provide you with excellent care. Hearing back from our patients is one way we can continue to improve our services. Please take a few minutes to complete the written survey that you may receive in the mail after you visit with us. Thank you!        [employer] [facility] Information     [facility] lets you send messages to your doctor, view your test results, renew your prescriptions, schedule appointments and more. To sign up, go to [email or web address] . Click on \"Log in\" on the left side of the screen, which will take you to the Welcome page. Then click on \"Sign up Now\" on the right side of the page.     You will be asked to enter the access code listed below, as well as some personal information. Please follow the directions to create your username and password.     Your access code is: [email or web address]  Expires: 9/10/2017  3:28 AM     Your access code will  in 90 days. If you need help or a new code, please call your [facility] clinic or [phone].        Care EveryWhere ID     " This is your Care EveryWhere ID. This could be used by other organizations to access your [facility] medical records  [id number]        After Visit Summary       This is your record. Keep this with you and show to your community pharmacist(s) and doctor(s) at your next visit.

## 2017-06-12 NOTE — ED NOTES
31-year-old female presents to the ER with complaints of blood on the outside of her stool. Pt states she noticed it twice in her stool tonight. Pt brought a sample of that stool in per phone nurse instructions. Aunt is a nurse and felt the blood was on the outside of the stool and not mixed into it. Pt is anxious at triage. States she is currently 17 weeks pregnant.

## 2018-12-24 ENCOUNTER — HOSPITAL ENCOUNTER (EMERGENCY)
Facility: CLINIC | Age: 33
Discharge: HOME | End: 2018-12-24
Attending: EMERGENCY MEDICINE | Admitting: EMERGENCY MEDICINE
Payer: COMMERCIAL

## 2018-12-24 ENCOUNTER — APPOINTMENT (OUTPATIENT)
Dept: CT IMAGING | Facility: CLINIC | Age: 33
End: 2018-12-24
Attending: EMERGENCY MEDICINE
Payer: COMMERCIAL

## 2018-12-24 VITALS
HEART RATE: 79 BPM | TEMPERATURE: 98.1 F | SYSTOLIC BLOOD PRESSURE: 124 MMHG | RESPIRATION RATE: 20 BRPM | BODY MASS INDEX: 41.02 KG/M2 | DIASTOLIC BLOOD PRESSURE: 75 MMHG | WEIGHT: 293 LBS | OXYGEN SATURATION: 98 % | HEIGHT: 71 IN

## 2018-12-24 DIAGNOSIS — R10.13 EPIGASTRIC PAIN: ICD-10-CM

## 2018-12-24 DIAGNOSIS — R07.89 ATYPICAL CHEST PAIN: ICD-10-CM

## 2018-12-24 LAB
ALBUMIN SERPL-MCNC: 3.1 G/DL (ref 3.4–5)
ALP SERPL-CCNC: 81 U/L (ref 40–150)
ALT SERPL W P-5'-P-CCNC: 48 U/L (ref 0–50)
ANION GAP SERPL CALCULATED.3IONS-SCNC: 5 MMOL/L (ref 3–14)
AST SERPL W P-5'-P-CCNC: 59 U/L (ref 0–45)
B-HCG FREE SERPL-ACNC: <5 IU/L
BASOPHILS # BLD AUTO: 0 10E9/L (ref 0–0.2)
BASOPHILS NFR BLD AUTO: 0.5 %
BILIRUB SERPL-MCNC: 0.5 MG/DL (ref 0.2–1.3)
BUN SERPL-MCNC: 14 MG/DL (ref 7–30)
CALCIUM SERPL-MCNC: 8.3 MG/DL (ref 8.5–10.1)
CHLORIDE SERPL-SCNC: 106 MMOL/L (ref 94–109)
CO2 SERPL-SCNC: 27 MMOL/L (ref 20–32)
CREAT SERPL-MCNC: 0.64 MG/DL (ref 0.52–1.04)
D DIMER PPP FEU-MCNC: 0.6 UG/ML FEU (ref 0–0.5)
DIFFERENTIAL METHOD BLD: ABNORMAL
EOSINOPHIL # BLD AUTO: 0.1 10E9/L (ref 0–0.7)
EOSINOPHIL NFR BLD AUTO: 0.7 %
ERYTHROCYTE [DISTWIDTH] IN BLOOD BY AUTOMATED COUNT: 15.6 % (ref 10–15)
GFR SERPL CREATININE-BSD FRML MDRD: >90 ML/MIN/{1.73_M2}
GLUCOSE SERPL-MCNC: 107 MG/DL (ref 70–99)
HCT VFR BLD AUTO: 44.2 % (ref 35–47)
HGB BLD-MCNC: 12.9 G/DL (ref 11.7–15.7)
IMM GRANULOCYTES # BLD: 0 10E9/L (ref 0–0.4)
IMM GRANULOCYTES NFR BLD: 0.1 %
IRON SATN MFR SERPL: 11 % (ref 15–46)
IRON SERPL-MCNC: 38 UG/DL (ref 35–180)
LIPASE SERPL-CCNC: 69 U/L (ref 73–393)
LYMPHOCYTES # BLD AUTO: 0.8 10E9/L (ref 0.8–5.3)
LYMPHOCYTES NFR BLD AUTO: 11.5 %
MCH RBC QN AUTO: 23.8 PG (ref 26.5–33)
MCHC RBC AUTO-ENTMCNC: 29.2 G/DL (ref 31.5–36.5)
MCV RBC AUTO: 82 FL (ref 78–100)
MONOCYTES # BLD AUTO: 0.3 10E9/L (ref 0–1.3)
MONOCYTES NFR BLD AUTO: 3.6 %
NEUTROPHILS # BLD AUTO: 6.1 10E9/L (ref 1.6–8.3)
NEUTROPHILS NFR BLD AUTO: 83.6 %
NRBC # BLD AUTO: 0 10*3/UL
NRBC BLD AUTO-RTO: 0 /100
PLATELET # BLD AUTO: 257 10E9/L (ref 150–450)
POTASSIUM SERPL-SCNC: 3.9 MMOL/L (ref 3.4–5.3)
PROT SERPL-MCNC: 7.4 G/DL (ref 6.8–8.8)
RBC # BLD AUTO: 5.42 10E12/L (ref 3.8–5.2)
SODIUM SERPL-SCNC: 138 MMOL/L (ref 133–144)
TIBC SERPL-MCNC: 354 UG/DL (ref 240–430)
TROPONIN I SERPL-MCNC: <0.015 UG/L (ref 0–0.04)
TROPONIN I SERPL-MCNC: <0.015 UG/L (ref 0–0.04)
WBC # BLD AUTO: 7.3 10E9/L (ref 4–11)

## 2018-12-24 PROCEDURE — 84484 ASSAY OF TROPONIN QUANT: CPT | Performed by: EMERGENCY MEDICINE

## 2018-12-24 PROCEDURE — 74177 CT ABD & PELVIS W/CONTRAST: CPT

## 2018-12-24 PROCEDURE — 25000128 H RX IP 250 OP 636: Performed by: EMERGENCY MEDICINE

## 2018-12-24 PROCEDURE — 83540 ASSAY OF IRON: CPT | Performed by: EMERGENCY MEDICINE

## 2018-12-24 PROCEDURE — 85379 FIBRIN DEGRADATION QUANT: CPT | Performed by: EMERGENCY MEDICINE

## 2018-12-24 PROCEDURE — 80053 COMPREHEN METABOLIC PANEL: CPT | Performed by: EMERGENCY MEDICINE

## 2018-12-24 PROCEDURE — 83550 IRON BINDING TEST: CPT | Performed by: EMERGENCY MEDICINE

## 2018-12-24 PROCEDURE — 96374 THER/PROPH/DIAG INJ IV PUSH: CPT | Mod: 59

## 2018-12-24 PROCEDURE — 93005 ELECTROCARDIOGRAM TRACING: CPT

## 2018-12-24 PROCEDURE — 99285 EMERGENCY DEPT VISIT HI MDM: CPT | Mod: 25

## 2018-12-24 PROCEDURE — 96375 TX/PRO/DX INJ NEW DRUG ADDON: CPT

## 2018-12-24 PROCEDURE — 84702 CHORIONIC GONADOTROPIN TEST: CPT

## 2018-12-24 PROCEDURE — 85025 COMPLETE CBC W/AUTO DIFF WBC: CPT | Performed by: EMERGENCY MEDICINE

## 2018-12-24 PROCEDURE — 25000132 ZZH RX MED GY IP 250 OP 250 PS 637: Performed by: EMERGENCY MEDICINE

## 2018-12-24 PROCEDURE — 83690 ASSAY OF LIPASE: CPT | Performed by: EMERGENCY MEDICINE

## 2018-12-24 PROCEDURE — 25000125 ZZHC RX 250: Performed by: EMERGENCY MEDICINE

## 2018-12-24 RX ORDER — ACETAMINOPHEN 500 MG
500-1000 TABLET ORAL EVERY 6 HOURS PRN
Qty: 60 TABLET | Refills: 0 | Status: SHIPPED | OUTPATIENT
Start: 2018-12-24 | End: 2018-12-31

## 2018-12-24 RX ORDER — ONDANSETRON 2 MG/ML
4 INJECTION INTRAMUSCULAR; INTRAVENOUS ONCE
Status: COMPLETED | OUTPATIENT
Start: 2018-12-24 | End: 2018-12-24

## 2018-12-24 RX ORDER — IOPAMIDOL 755 MG/ML
500 INJECTION, SOLUTION INTRAVASCULAR ONCE
Status: COMPLETED | OUTPATIENT
Start: 2018-12-24 | End: 2018-12-24

## 2018-12-24 RX ORDER — ONDANSETRON 4 MG/1
4 TABLET, ORALLY DISINTEGRATING ORAL EVERY 8 HOURS PRN
Qty: 10 TABLET | Refills: 0 | Status: SHIPPED | OUTPATIENT
Start: 2018-12-24 | End: 2018-12-27

## 2018-12-24 RX ORDER — KETOROLAC TROMETHAMINE 15 MG/ML
15 INJECTION, SOLUTION INTRAMUSCULAR; INTRAVENOUS ONCE
Status: COMPLETED | OUTPATIENT
Start: 2018-12-24 | End: 2018-12-24

## 2018-12-24 RX ORDER — TRAZODONE HYDROCHLORIDE 50 MG/1
50 TABLET, FILM COATED ORAL AT BEDTIME
COMMUNITY

## 2018-12-24 RX ADMIN — LIDOCAINE HYDROCHLORIDE 30 ML: 20 SOLUTION ORAL; TOPICAL at 15:36

## 2018-12-24 RX ADMIN — ONDANSETRON 4 MG: 2 INJECTION INTRAMUSCULAR; INTRAVENOUS at 15:37

## 2018-12-24 RX ADMIN — SODIUM CHLORIDE 80 ML: 9 INJECTION, SOLUTION INTRAVENOUS at 18:00

## 2018-12-24 RX ADMIN — KETOROLAC TROMETHAMINE 15 MG: 15 INJECTION, SOLUTION INTRAMUSCULAR; INTRAVENOUS at 16:20

## 2018-12-24 RX ADMIN — IOPAMIDOL 100 ML: 755 INJECTION, SOLUTION INTRAVENOUS at 18:00

## 2018-12-24 ASSESSMENT — MIFFLIN-ST. JEOR: SCORE: 2678.56

## 2018-12-24 NOTE — ED PROVIDER NOTES
History     Chief Complaint:  Chest Pain      HPI   Shannan Savage is a 32 year old female who presents with multiple complaints    1.  Chest pain: retrosternal near the junction of the lower sternum and epigastrium, intermittent over the last 2 days, slightly worse with laying flat versus sitting up, subjective chills no significant sore throat cough runny nose.  History of DVT attributed oral contraceptives in the past was on blood thinners for standard period of time and taken off now on progesterone only contraception in the form of Depo-Provera.  No recent travel, no family members with early cardiac death.    2. Nausea vomiting diarrhea: Started this morning 8 episodes of nonbilious nonbloody emesis multiple loose stools not described as melena.  No known sick contacts    3.  Vaginal bleeding: Has had regular for a period since November and December with several day.  Midcycle of heavy uterine bleeding soaking a pad once an hour for several days.  Currently is having 1 of those mid cycle breakthrough bleeding episodes now.  Containing oral contraception due to previous history of DVT.  Cannot get into OB/GYN until January.      No associated dysuria, chest pain not worse with inspiration, no recent falls or trauma        Cardiac Risk Factors   Sex: Female   Tobacco: Negative   Hypertension: Negative  Diabetes: Prediabetes  Hyperlipidemia: Negative  Family History: Negative    PE/DVT Risk Factors   Personal History: Positive  Recent Travel: Negative   Recent Surgery/Hospitalization: Negative  Tobacco: Negative  Family History: Negative  Hormone Use: Progesterone only  Cancer: Negative  Trauma: Negative      Allergies:  Codeine  Latex     Medications:    Citalopram hydrobromide  Midrin  Desyrel  Tylenol  Flexeril  Benadryl  Colace  Imitrex     Past Medical History:    Bipolar 1 disorder  Migraines  Prediabetes    Past Surgical History:    GI surgery    Family History:    Mesothelioma     Social  "History:  Presents to the ED by self  Tobacco Use: Never  Alcohol Use: No  PCP: Omer Ziegler     Review of Systems   ROS: 10 point ROS neg other than the symptoms noted above in the HPI.      Physical Exam   First Vitals:  BP: (!) 208/121  Heart Rate: 85  Temp: 98.1  F (36.7  C)  Resp: 20  Height: 180.3 cm (5' 11\")  Weight: (!) 188.2 kg (415 lb)  SpO2: 99 %      Physical Exam  Gen: NAD  HEENT:  mmm, no rhinorrhea  Neck: supple, no abnormal swelling or tenderness  Lungs:  CTAB,  no resp distress  CV: rrr, no m/r/g, ppi, no pain along sternum  Abd: soft, + mild TTP epigastrium, nondistended, no rebound/masses/guarding/hsm  Ext: no peripheral edema  Skin: warm, dry, well perfused, no rashes/bruising/lesions on exposed skin  Neuro: alert, MAEE, no gross motor or sensory deficits, gait stable  Psych: Normal mood, normal affect      Emergency Department Course   ECG:  @ 1348  Indication: Chest pain  Vent. Rate 79 bpm. W[place] interval 150 ms. QRS duration 76 ms. QT/QTc 346/396 ms. P-R-T axis 63 33 51.   Normal sinus rhythm. Right atrial enlargement. Borderline ECG.  No significant change when compared to previous ECG from 4/17/17   Read @ 1500 by Dr. Valdes.    Imaging:  Radiographic findings were communicated with the patient who voiced understanding of the findings.    CT Chest (PE) Abdomen Pelvis w Contrast   Final Result   IMPRESSION:    1. No acute process demonstrated within the chest, abdomen and pelvis.      2. Splenomegaly, increased from previous.      ELIESER BACA MD        Laboratory:  CBC:  WBC 7.3, HGB 12.9,   CMP: Glucose 107 (H), calcium 8.3 (L), albumin 3.1 (L), AST 59 (H), otherwise WNL (Creatinine 0.64)  (1540) Troponin: <0.015  (1822) Troponin: <0.015  D Dimer: 0.6 (H)  Lipase: 69 (L)  ISTAT HCG: <5.0  Iron: 38  Iron binding capacity: 354  Iron saturation index: 11 (L)    Interventions:  1536: GI cocktail, 30 mL, oral   1537: Zofran, 4 mg, IV injection   1620: Toradol, 15 mg, IV " injection    Emergency Department Course:  The patient arrived in triage where vitals were measured and recorded.   The patient was then escorted back to the emergency department.   The patient's medical records were reviewed.  Nursing notes and vitals were reviewed.  1455: I performed an exam of the patient as documented above.  The above workup was undertaken.  1831: I rechecked the patient and discussed results.  1859: I rechecked the patient and discussed results.   Findings and plan explained to the Patient. Patient discharged home, status improved, with instructions regarding supportive care, medications, and reasons to return as well as the importance of close follow-up was reviewed. Patient was prescribed Zofran and Tylenol.        Impression & Plan      Medical Decision Making:  Broad differential given the multitude of complaints.  Will need a workup to rule out acute coronary syndrome or stratify for pulmonary embolism unlikely to be infectious origin but certainly could be musculoskeletal or GI related.  Would also have to worry about symptomatic anemia given the dysfunctional uterine bleeding she describes.  It is hard to incorporate the nausea vomiting diarrhea into this it could just be a viral syndrome or concomitant viral gastroenteritis in the setting of the above differential.        Work appears unremarkable for significant cardiopulmonary etiology or significant intra-abdominal pathology.  Symptoms improved with the above interventions.  Given no evidence of emergent cardiopulmonary disease or surgical/infectious/vascular pathology in the abdomen we will discharge her home and follow her symptoms clinically she is comfortable and agreeable with that plan.  She understands what is known and unknown at this time when to follow-up and when to return to the ER.    Diagnosis:    ICD-10-CM    1. Atypical chest pain R07.89    2. Epigastric pain R10.13        Disposition:  Discharged to home.      Discharge Medications:     Medication List      Started    ondansetron 4 MG ODT tab  Commonly known as:  ZOFRAN ODT  4 mg, Oral, EVERY 8 HOURS PRN        Modified    acetaminophen 500 MG tablet  Commonly known as:  TYLENOL  500-1,000 mg, Oral, EVERY 6 HOURS PRN  What changed:      medication strength    how much to take    when to take this    reasons to take this              I, Tracie Wheeler, am serving as a scribe on 12/24/2018 at 2:55 PM to personally document services performed by Dr. Valdes based on my observations and the provider's statements to me.   [facility] EMERGENCY DEPARTMENT       Malcolm Valdes MD  12/25/18 0023

## 2018-12-24 NOTE — ED TRIAGE NOTES
Patient presents with chest pain that started today. Patient has also been having periods 2 times a month since October when she started getting depovera shot. Patient states she has been feeling dizzy along with vaginal bleeding.

## 2018-12-24 NOTE — ED AVS SNAPSHOT
[facility] Emergency Department  [address]  [facility] [address]  Phone:  [phone]  Fax:  [phone]                                    Shannan Savage   MRN: [id number]    Department:  [facility] Emergency Department   Date of Visit:  12/24/2018           After Visit Summary Signature Page    I have received my discharge instructions, and my questions have been answered. I have discussed any challenges I see with this plan with the nurse or doctor.    ..........................................................................................................................................  Patient/Patient Representative Signature      ..........................................................................................................................................  Patient Representative Print Name and Relationship to Patient    ..................................................               ................................................  Date                                   Time    ..........................................................................................................................................  Reviewed by Signature/Title    ...................................................              ..............................................  Date                                               Time          22EPIC Rev 08/18

## 2018-12-26 LAB — INTERPRETATION ECG - MUSE: NORMAL

## 2020-02-01 ENCOUNTER — HOSPITAL ENCOUNTER (EMERGENCY)
Facility: CLINIC | Age: 35
Discharge: HOME | End: 2020-02-02
Attending: PHYSICIAN ASSISTANT | Admitting: PHYSICIAN ASSISTANT
Payer: COMMERCIAL

## 2020-02-01 DIAGNOSIS — R07.9 CHEST PAIN: ICD-10-CM

## 2020-02-01 LAB
ANION GAP SERPL CALCULATED.3IONS-SCNC: 3 MMOL/L (ref 3–14)
BASOPHILS # BLD AUTO: 0.1 10E9/L (ref 0–0.2)
BASOPHILS NFR BLD AUTO: 0.9 %
BUN SERPL-MCNC: 10 MG/DL (ref 7–30)
CALCIUM SERPL-MCNC: 9 MG/DL (ref 8.5–10.1)
CHLORIDE SERPL-SCNC: 107 MMOL/L (ref 94–109)
CO2 SERPL-SCNC: 30 MMOL/L (ref 20–32)
CREAT SERPL-MCNC: 0.72 MG/DL (ref 0.52–1.04)
D DIMER PPP FEU-MCNC: 0.7 UG/ML FEU (ref 0–0.5)
DIFFERENTIAL METHOD BLD: ABNORMAL
EOSINOPHIL # BLD AUTO: 0.2 10E9/L (ref 0–0.7)
EOSINOPHIL NFR BLD AUTO: 2.4 %
ERYTHROCYTE [DISTWIDTH] IN BLOOD BY AUTOMATED COUNT: 16.4 % (ref 10–15)
GFR SERPL CREATININE-BSD FRML MDRD: >90 ML/MIN/{1.73_M2}
GLUCOSE SERPL-MCNC: 87 MG/DL (ref 70–99)
HCT VFR BLD AUTO: 40.8 % (ref 35–47)
HGB BLD-MCNC: 11.7 G/DL (ref 11.7–15.7)
IMM GRANULOCYTES # BLD: 0 10E9/L (ref 0–0.4)
IMM GRANULOCYTES NFR BLD: 0.3 %
LYMPHOCYTES # BLD AUTO: 2.4 10E9/L (ref 0.8–5.3)
LYMPHOCYTES NFR BLD AUTO: 31.7 %
MCH RBC QN AUTO: 22.2 PG (ref 26.5–33)
MCHC RBC AUTO-ENTMCNC: 28.7 G/DL (ref 31.5–36.5)
MCV RBC AUTO: 78 FL (ref 78–100)
MONOCYTES # BLD AUTO: 0.4 10E9/L (ref 0–1.3)
MONOCYTES NFR BLD AUTO: 5.5 %
NEUTROPHILS # BLD AUTO: 4.5 10E9/L (ref 1.6–8.3)
NEUTROPHILS NFR BLD AUTO: 59.2 %
NRBC # BLD AUTO: 0 10*3/UL
NRBC BLD AUTO-RTO: 0 /100
PLATELET # BLD AUTO: 262 10E9/L (ref 150–450)
POTASSIUM SERPL-SCNC: 3.7 MMOL/L (ref 3.4–5.3)
RBC # BLD AUTO: 5.26 10E12/L (ref 3.8–5.2)
SODIUM SERPL-SCNC: 140 MMOL/L (ref 133–144)
TROPONIN I SERPL-MCNC: <0.015 UG/L (ref 0–0.04)
WBC # BLD AUTO: 7.6 10E9/L (ref 4–11)

## 2020-02-01 PROCEDURE — 85379 FIBRIN DEGRADATION QUANT: CPT | Performed by: PHYSICIAN ASSISTANT

## 2020-02-01 PROCEDURE — 80048 BASIC METABOLIC PNL TOTAL CA: CPT | Performed by: PHYSICIAN ASSISTANT

## 2020-02-01 PROCEDURE — 84484 ASSAY OF TROPONIN QUANT: CPT | Performed by: PHYSICIAN ASSISTANT

## 2020-02-01 PROCEDURE — 93005 ELECTROCARDIOGRAM TRACING: CPT

## 2020-02-01 PROCEDURE — 25000128 H RX IP 250 OP 636: Performed by: PHYSICIAN ASSISTANT

## 2020-02-01 PROCEDURE — 96374 THER/PROPH/DIAG INJ IV PUSH: CPT | Mod: 59

## 2020-02-01 PROCEDURE — 85025 COMPLETE CBC W/AUTO DIFF WBC: CPT | Performed by: PHYSICIAN ASSISTANT

## 2020-02-01 PROCEDURE — 99285 EMERGENCY DEPT VISIT HI MDM: CPT | Mod: 25

## 2020-02-01 RX ORDER — KETOROLAC TROMETHAMINE 15 MG/ML
15 INJECTION, SOLUTION INTRAMUSCULAR; INTRAVENOUS ONCE
Status: COMPLETED | OUTPATIENT
Start: 2020-02-01 | End: 2020-02-01

## 2020-02-01 RX ADMIN — KETOROLAC TROMETHAMINE 15 MG: 15 INJECTION, SOLUTION INTRAMUSCULAR; INTRAVENOUS at 23:31

## 2020-02-01 NOTE — ED AVS SNAPSHOT
[facility] Emergency Department  [address]  [facility] [address]  Phone:  [phone]  Fax:  [phone]                                    Shannan Savage   MRN: [id number]    Department:  [facility] Emergency Department   Date of Visit:  2/1/2020           After Visit Summary Signature Page    I have received my discharge instructions, and my questions have been answered. I have discussed any challenges I see with this plan with the nurse or doctor.    ..........................................................................................................................................  Patient/Patient Representative Signature      ..........................................................................................................................................  Patient Representative Print Name and Relationship to Patient    ..................................................               ................................................  Date                                   Time    ..........................................................................................................................................  Reviewed by Signature/Title    ...................................................              ..............................................  Date                                               Time          22EPIC Rev 08/18

## 2020-02-02 ENCOUNTER — APPOINTMENT (OUTPATIENT)
Dept: CT IMAGING | Facility: CLINIC | Age: 35
End: 2020-02-02
Attending: PHYSICIAN ASSISTANT
Payer: COMMERCIAL

## 2020-02-02 VITALS
SYSTOLIC BLOOD PRESSURE: 103 MMHG | BODY MASS INDEX: 61.93 KG/M2 | OXYGEN SATURATION: 98 % | DIASTOLIC BLOOD PRESSURE: 61 MMHG | HEART RATE: 75 BPM | WEIGHT: 293 LBS

## 2020-02-02 LAB — TROPONIN I SERPL-MCNC: <0.015 UG/L (ref 0–0.04)

## 2020-02-02 PROCEDURE — 84484 ASSAY OF TROPONIN QUANT: CPT | Performed by: PHYSICIAN ASSISTANT

## 2020-02-02 PROCEDURE — 25000128 H RX IP 250 OP 636: Performed by: PHYSICIAN ASSISTANT

## 2020-02-02 PROCEDURE — 25000125 ZZHC RX 250: Performed by: PHYSICIAN ASSISTANT

## 2020-02-02 PROCEDURE — 71275 CT ANGIOGRAPHY CHEST: CPT

## 2020-02-02 RX ORDER — IOPAMIDOL 755 MG/ML
500 INJECTION, SOLUTION INTRAVASCULAR ONCE
Status: COMPLETED | OUTPATIENT
Start: 2020-02-02 | End: 2020-02-02

## 2020-02-02 RX ADMIN — SODIUM CHLORIDE 91 ML: 9 INJECTION, SOLUTION INTRAVENOUS at 00:37

## 2020-02-02 RX ADMIN — IOPAMIDOL 77 ML: 755 INJECTION, SOLUTION INTRAVENOUS at 00:37

## 2020-02-02 ASSESSMENT — ENCOUNTER SYMPTOMS
VOMITING: 0
COUGH: 0
FEVER: 0
SHORTNESS OF BREATH: 1
NAUSEA: 0

## 2020-02-02 NOTE — ED TRIAGE NOTES
"Patient sitting on couch and took a drink of water then experienced very sharp midsternal chest pain. \"feels like I am getting multiple shots\". Associated shortness of breath. The fresh air made it better. Denies fever. Denies recent cough. Has had this pain before about a year and a half ago - \"they said it was an anxiety attack\"  "

## 2020-02-02 NOTE — ED PROVIDER NOTES
History     Chief Complaint:  Chest Pain    HPI   Shannan Savage is a 34 year old female who presents with chest pain. Patient reports that she was sitting at home watching TV when she took a drink of water to swallow some pills and suddenly developed midsternal chest pain that was sharp and stabbing in nature. Onset around 10 pm and symptoms have been constant since. She reports associated SOB as well as increased pain with breathing. She denies recent cough or fever. She does report a history of DVT in the past that was provoked by OCPs. She completed appropriate anticoagulation for that. She denies any leg swelling or pain. She does report one similar episode of pain in the past that was attributed to anxiety.     Allergies:  Latex  Codeine     Medications:    Citalopram  Flexeril  Desyrel  Lovenox  Lamictal  Imitrex  Labetalol  Midrin     Past Medical History:    Bipolar 1   Prediabetes  Morbid obesity  Migraine  DVT    Past Surgical History:    Cholecystectomy  Bone graft    Family History:    Mesothelioma    Social History:  Accompanied by niece.  Never Smoker  Alcohol Use: No  Marital Status: Single      Review of Systems   Constitutional: Negative for fever.   Respiratory: Positive for shortness of breath. Negative for cough.    Cardiovascular: Positive for chest pain. Negative for leg swelling.   Gastrointestinal: Negative for nausea and vomiting.   All other systems reviewed and are negative.        Physical Exam     Patient Vitals for the past 24 hrs:   BP Pulse SpO2 Weight   02/02/20 0200 103/61 75 98 % --   02/02/20 0145 108/62 74 97 % --   02/02/20 0130 108/50 74 99 % --   02/02/20 0115 111/58 76 100 % --   02/02/20 0100 122/58 73 99 % --   02/02/20 0015 119/65 81 -- --   02/02/20 0012 -- -- -- (!) 201.4 kg (444 lb 0.1 oz)   02/02/20 0000 113/49 85 98 % --   02/01/20 2345 115/70 82 98 % --   02/01/20 2330 115/81 74 97 % --       Physical Exam  Constitutional: well appearing female in no acute  distress.   Head: No external signs of trauma noted to head or face.   Eyes: Pupils are equal, round, and reactive to light. Conjunctiva normal.  ENT: MMM. Normal voice.   Neck: normal ROM.   Cardiovascular: Normal rate, regular rhythm, and intact distal pulses.    Respiratory: Effort normal. No respiratory distress. Lungs clear to auscultation bilaterally.   GI: Soft. Non-tender.   Musculoskeletal: No deformities appreciated. Normal ROM. No lower extremity edema noted.  Neurological: Alert and Oriented x 3. Speech normal. Moves all extremities equally.    Psychiatric: Appropriate mood, affect, and behavior.   Skin: Skin is warm and dry.       Emergency Department Course   ECG:  ECG taken at 2320, ECG read at 2343  Normal sinus rhythm  Normal ECG  Rate 85 bpm. [place] interval 152 ms. QRS duration 78 ms. QT/QTc 340/404 ms. P-R-T axes 55 43 17.  No significant changes compared to prior ECG on 12/24/18.    Imaging:  CT Chest Pulmonary Embolism w Contrast   Final Result   IMPRESSION:   1.  No large central pulmonary embolus. Small peripheral emboli cannot be ruled out on this suboptimal exam.   2.  No aortic aneurysm or dissection.   3.  No acute abnormality.        Laboratory:  Labs Ordered and Resulted from Time of ED Arrival Up to the Time of Departure from the ED   CBC WITH PLATELETS DIFFERENTIAL - Abnormal; Notable for the following components:       Result Value    RBC Count 5.26 (*)     MCH 22.2 (*)     MCHC 28.7 (*)     RDW 16.4 (*)     All other components within normal limits   D DIMER QUANTITATIVE - Abnormal; Notable for the following components:    D Dimer 0.7 (*)     All other components within normal limits   BASIC METABOLIC PANEL   TROPONIN I   TROPONIN I      Interventions:  Medications   ketorolac (TORADOL) injection 15 mg (15 mg Intravenous Given 2/1/20 2331)   Saline CT scan flush (91 mLs Intravenous Given 2/2/20 0037)   iopamidol (ISOVUE-370) solution 500 mL (77 mLs Intravenous Given 2/2/20 0037)      Emergency Department Course:  Past medical records, nursing notes, and vitals reviewed.  I obtained history and performed an exam of the patient as documented above.     IV inserted. Medicine administered as documented above. Blood drawn. This was sent to the lab for further testing, results above.    The patient was sent for a Chest CT with contrast while in the emergency department, findings above.     I rechecked the patient and discussed the results of her workup thus far.     Repeat troponin was drawn.    I rechecked and updated the patient.     Findings and plan explained to the Patient. Patient discharged home with instructions regarding supportive care, medications, and reasons to return. The importance of close follow-up was reviewed.    I personally reviewed the laboratory results with the Patient and answered all related questions prior to discharge.         Impression & Plan      Medical Decision Makin year old female presenting with chest pain. I considered a broad differential diagnosis in this patient including life-threatening etiologies such as acute coronary syndrome, myocardial infarction, pulmonary embolism, acute aortic dissection, myocarditis, pericarditis, acute valvular insufficiency amongst others.  Other causes considered for this patient included pneumonia, pneumothorax, chest wall source, pericarditis, pleurisy, esophageal spasm, etc.  EKG showed normal sinus rhythm without evidence of ischemia. Troponin is negative x 2. I considered PE given her history of DVT and unfortunately a d-dimer was positive so a CT was obtained. This showed no evidence of PE, aortic aneurysm, or dissection. No other acute abnormality noted. The work up in the Emergency Department is negative. No serious etiology for the chest pain were detected today during this visit.  Close follow up with primary care is indicated should the pain continue, as further work up may be performed; this was made clear to  the patient, who understands.       Diagnosis:    ICD-10-CM    1. Chest pain R07.9        Disposition:  discharged to home       [facility] EMERGENCY DEPARTMENT       Sehlly Herring PA-C  02/02/20 0305

## 2020-02-03 LAB — INTERPRETATION ECG - MUSE: NORMAL

## 2020-03-04 ENCOUNTER — HOSPITAL ENCOUNTER (OUTPATIENT)
Dept: BEHAVIORAL HEALTH | Facility: CLINIC | Age: 35
Discharge: HOME | End: 2020-03-04
Attending: SOCIAL WORKER | Admitting: SOCIAL WORKER
Payer: COMMERCIAL

## 2020-03-04 ENCOUNTER — BEH TREATMENT PLAN (OUTPATIENT)
Dept: BEHAVIORAL HEALTH | Facility: CLINIC | Age: 35
End: 2020-03-04

## 2020-03-04 PROCEDURE — 90791 PSYCH DIAGNOSTIC EVALUATION: CPT | Performed by: COUNSELOR

## 2020-03-04 NOTE — PROGRESS NOTES
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 discuss past court records, pre-sentence investigations, and the current charges Ms. Savage is facing.  While the patient did not report a past diagnosis for Pathological Gambling, there is a record from 2007 with a recommendation to attend gambling treatment.   The patient did report attending [facility] for a few months in 2009, it seems this may have satisfied that court order.  Probation reported more illegal activities, and reported there being some disconnect between the crimes and why she needed the additional money.          Summary of Gambling Disorder Symptoms     Patient reported no consequences of her gambling on the DSM-5.    Specify if:   Episodic:  Meeting diagnostic at more than one time point, with symptoms subsiding between periods of gambling disorder for at least several months.    Persistent:  Experiencing continuous symptoms, to meet diagnostic criteria for multiple years.    Specify if:   In early remission:  After full criteria for alcohol/drug use disorder were previously met, none of the criteria for alcohol/drug use disorder have been met for at least 3  months but for less than 12 months (with the exception that Criterion A4,  Craving or a strong desire or urge to use alcohol/drug  may be met).     In sustained remission:   After full criteria for alcohol use disorder were previously met, none of the criteria for alcohol/drug use disorder have been met at any time during a period of 12 months or longer (with the exception that Criterion A4,  Craving or strong desire or urge to use alcohol/drug  may be met).   Specify if:   This additional specifier is used if the individual is in an environment where access to alcohol is restricted.    Mild: Presence of 4-5 symptoms    Moderate: Presence of 6-7 symptoms    Severe: Presence of 8 or more symptoms      Summary of Substance Abuse Disorder Symptoms     A problematic pattern of alcohol/drug use leading to clinically significant impairment or distress, as manifested by at least two of the following, occurring within a 12-month period:    NA    Specify if:   In early remission:  After full criteria for alcohol/drug use disorder were previously met, none of the criteria for alcohol/drug use disorder have been met for at least 3 months but for less than 12 months (with the exception that Criterion A4,  Craving or a strong desire or urge to use alcohol/drug  may be met).     In sustained remission:   After full criteria for alcohol use disorder were previously met, none of the criteria for alcohol/drug use disorder have been met at any time during a period of 12 months or longer (with the exception that Criterion A4,  Craving or strong desire or urge to use alcohol/drug  may be met).   Specify if:   This additional specifier is used if the individual is in an environment where access to alcohol is restricted.    Mild: Presence of 2-3 symptoms    Moderate: Presence of 4-5 symptoms    Severe: Presence of 6 or more symptoms    SOGS: 9 DSM-5: 0 CAGE-AID: 0 KONG-7: 5 PHQ-9: 11     Mental Status Assessment    Physical  Appearance/Attire:  Appears stated age  Hygiene:  well groomed  Eye Contact:  at examiner  Speech:  regular  Speech Volume:  regular  Speech Quality: fluid  Cognitive/Perceptual:  reality based  Cognition:  memory intact   Judgment:  intact  Insight:  intact  Orientation:  time, place, person and situation  Thought:  logical   Hallucinations:  none  General Behavioral Tone:  cooperative  Psychomotor Activity:  no problem noted  Gait:  no problem  Mood:  normal  Affect:  congruence/appropriate      Vulnerable Adult Checklist for OUTPATIENTS     1.  Do you have a physical, emotional or mental infirmity or dysfunction?       No    2.  Does this issue impair your ability to provide for your own care without help, including providing yourself with food, shelter, clothing, healthcare or supervision?       No    3.  Because of this issue, I need assistance to protect myself from maltreatment by others.      No    Based on the above information:    This person is not a functional Vulnerable Adult according to [place] Statute 626.5572 subdivision 21.      Category Severity (ICD-10 Code / DSM 5 Code)   Gambling Disorder NA   Alcohol Use Disorder NA   Cannabis Use Disorder NA   Hallucinogen Use Disorder NA   Inhalant Use Disorder NA   Opioid Use Disorder NA   Sedative, Hypnotic, or Anxiolytic Use Disorder NA   Stimulant Related Disorder NA   Tobacco Use Disorder NA   Other (or unknown) Substance Use Disorder NA     Luquillo-Suicide Severity Rating Scale   Suicide Ideation   1.) Have you ever wished you were dead or that you could go to sleep and not wake up?     Lifetime:  Yes Past Month:  No   2.) Have you actually had any thoughts of killing yourself?   Lifetime:  Yes Past Month:  No   3.) Have you been thinking about how you might do this?     Lifetime:  Yes, Describe: pills Past Month:  No   4.) Have you had these thoughts and had some intention of acting on them?     Lifetime:  Yes, Describe: overdosed in 2008 with  Depakote Past Month:  No   5.) Have you started to work out the details of how to kill yourself?   Lifetime:  Yes, Describe: took pills Past Month:  No   6.) Do you intend to carry out this plan?      Lifetime:  Yes, Describe: took pills Past Month:  No   Intensity of Ideation   Intensity of ideation (1 being least severe, 5 being most severe):     Lifetime:  4 Past Month:  NA   How often do you have these thoughts?  N/A      When you have the thoughts how long do they last?  N/A   Can you stop thinking about killing yourself or wanting to die if you want to?  N/A   Are there things - anyone or anything (i.e. family, Anglican, pain of death) that stopped you from wanting to die or acting on thoughts of suicide?  Protective factors definitely did not stop you   What sort of reasons did you have for thinking about wanting to die or killing yourself (ie end pain, stop how you were feeling, get attention or reaction, revenge)?  Completely to end or stop the pain (you couldn't go on living the way you were feeling)   Suicidal Behavior   (Suicide Attempt) - Have you made a suicide attempt?     Lifetime:  Yes.  Total number of attempts:  one.  Date of most recent attempt:  2008. Past Month:  No   Have you engaged in self-harm (non-suicidal self-injury)?  No   (Interrupted Attempt) - Has there been a time when you started to do something to end your life but someone or something stopped you before you actually did anything?  No   (Aborted or Self-Interrupted Attempt) - Has there been a time when you started to do something to try to end your life but you stopped yourself before you actually did anything?  No   (Preparatory Acts of Behavior) - Have you taken any steps towards making suicide attempt or preparing to kill yourself (such as collecting pills, getting a gun, giving valuables away or writing a suicide note)?  NA   Actual Lethality/Medical Damage:  5. - Death     2008  [employer]  "[employer]  Used with permission by Betty Grewal, PhD.       Guide to C-SSRS Risk Ratings   NO IDEATION:  with no active thoughts IDEATION: with a wish to die. IDEATION: with active thoughts. Risk Ratings   If Yes No No 0 - Very Low Risk   If NA Yes No 1 - Low Risk   If NA Yes Yes 2 - Low/moderate risk   IDEATION: associated thoughts of methods without intent or plan INTENT: Intent to follow through on suicide PLAN: Plan to follow through on suicide Risk Ratings cont...   If Yes No No 3 - Moderate Risk   If Yes Yes No 4 - High Risk   If Yes Yes Yes 5 - High Risk   The patient's ADDITIONAL RISK FACTORS and lack of PROTECTIVE FACTORS may increase their overall suicide risk ratings.     Additional Risk Factors:    Significant history of physical illness or chronic medical problems     Tendency to be socially isolated and/or cut off from the support of others     A recent loss that was significant to the patient, i.e. loss of job, loss of home, divorce, break-up, etc.     History of impulsive or aggressive behaviors     Significant legal problems including being at risk of incarceration   Protective Factors:    Having people in his/her life that would prevent the patient from considering a suicide attempt (i.e. young children, spouse, parents, etc.)     An absence of mental health issues or stable and well treated mental health issues     An absence of chronic health problems or stable and well treated chronic health issues     A positive relationship with his/her clinical medical and/or mental health providers     Having easy access to supportive family members     Having cultural, Confucianist or spiritual beliefs that discourage suicide     Having restricted access to highly lethal means of suicide     Risk Status   0. - Very Low Risk:  Evaluation Counselors:  Document in Epic / SBAR to counselor \"Very Low Risk\".      Treatment Counselors:  Reassess upon admission as applicable, assess weekly in progress notes under Dimension " 3 and summarize in Discharge / Treatment summary under Dimension 3.   Additional information to support suicide risk rating: There was no additional information to provide at this time.     Summary of [facility] Placement Criteria:   I.) Intoxication and Withdrawal: 0   II.) Biomedical:  1   III.) Emotional and Behavioral:  1   IV.) Readiness to Change:  3   V.) Relapse Potential: 3   VI.) Recovery Environmental: 3     Evaluation Summary and Plan   's Recommendation      At the beginning of the assessment patient was offered information on inpatient treatment, other outpatient options, individual counselors, and [employer].      Patient reported a history of gambling and consequences due to her gambling yet reported that she has not gambled in 4 years.  Given that there is no reasonable explanation for the continued illegal activity to obtain money, it is possible there is still a gambling issue that she is not reporting.  At this time, given her past history of gambling, she would benefit from meeting one on one with a problem gambling counselor as long as the counselor feels it is therapeutically necessary. This would allow her to learn about how her past gambling impacted her life choices and learn coping skills to help her with life stressors.    Patient is to contact either of the Problem Gambling Counselors below to set up intake.  Patient is to sign a release to [facility] so the individual counselor can receive a copy of this assessment and be able to collaborate with this writer.  Patient is to follow recommendations and continue in individual counseling as long as the counselor feels it is therapeutically necessary.    Rema Willis  [address] [place] [address] [phone]    Merle Zayas  [facility]  [address] [place] [id number] [phone]    If Ms. Savage comes to the attention of the courts again due to theft or val it is recommended  she return to [facility] to obtain an updated Problem Gambling Evaluation and follow updated recommendations.     Initial problem list:    The patient lacks relapse prevention skills  The patient has poor coping skills  The patient has poor refusal skills   The patient has a significant history of grief and loss issues  The patient has current legal issues    Strengths:  Family support  Properly treated mental health concerns  Properly treated physical health concerns  Stable housing  Functional communication skills

## 2020-03-23 ASSESSMENT — ANXIETY QUESTIONNAIRES
3. WORRYING TOO MUCH ABOUT DIFFERENT THINGS: SEVERAL DAYS
5. BEING SO RESTLESS THAT IT IS HARD TO SIT STILL: NOT AT ALL
7. FEELING AFRAID AS IF SOMETHING AWFUL MIGHT HAPPEN: NOT AT ALL
GAD7 TOTAL SCORE: 5
6. BECOMING EASILY ANNOYED OR IRRITABLE: MORE THAN HALF THE DAYS
2. NOT BEING ABLE TO STOP OR CONTROL WORRYING: SEVERAL DAYS
IF YOU CHECKED OFF ANY PROBLEMS ON THIS QUESTIONNAIRE, HOW DIFFICULT HAVE THESE PROBLEMS MADE IT FOR YOU TO DO YOUR WORK, TAKE CARE OF THINGS AT HOME, OR GET ALONG WITH OTHER PEOPLE: SOMEWHAT DIFFICULT
4. TROUBLE RELAXING: SEVERAL DAYS
1. FEELING NERVOUS, ANXIOUS, OR ON EDGE: NOT AT ALL

## 2020-03-23 ASSESSMENT — PATIENT HEALTH QUESTIONNAIRE - PHQ9: SUM OF ALL RESPONSES TO PHQ QUESTIONS 1-9: 11

## 2020-03-24 ASSESSMENT — ANXIETY QUESTIONNAIRES: GAD7 TOTAL SCORE: 5

## 2020-04-20 ENCOUNTER — TELEPHONE (OUTPATIENT)
Dept: BEHAVIORAL HEALTH | Facility: CLINIC | Age: 35
End: 2020-04-20

## 2020-07-28 ENCOUNTER — TELEPHONE (OUTPATIENT)
Dept: PSYCHOLOGY | Facility: CLINIC | Age: 35
End: 2020-07-28

## 2020-07-28 NOTE — TELEPHONE ENCOUNTER
Provider called patient for her 8:00 AM session and left a message reminding patient of her session.

## 2021-07-04 ENCOUNTER — HEALTH MAINTENANCE LETTER (OUTPATIENT)
Age: 36
End: 2021-07-04

## 2021-10-24 ENCOUNTER — HEALTH MAINTENANCE LETTER (OUTPATIENT)
Age: 36
End: 2021-10-24

## 2022-04-23 ENCOUNTER — HOSPITAL ENCOUNTER (EMERGENCY)
Facility: CLINIC | Age: 37
Discharge: HOME | End: 2022-04-23
Attending: EMERGENCY MEDICINE | Admitting: EMERGENCY MEDICINE
Payer: COMMERCIAL

## 2022-04-23 VITALS
BODY MASS INDEX: 61.28 KG/M2 | SYSTOLIC BLOOD PRESSURE: 122 MMHG | DIASTOLIC BLOOD PRESSURE: 86 MMHG | HEART RATE: 77 BPM | WEIGHT: 293 LBS | RESPIRATION RATE: 18 BRPM | TEMPERATURE: 97.9 F | OXYGEN SATURATION: 98 %

## 2022-04-23 DIAGNOSIS — R19.7 VOMITING AND DIARRHEA: ICD-10-CM

## 2022-04-23 DIAGNOSIS — R11.10 VOMITING AND DIARRHEA: ICD-10-CM

## 2022-04-23 LAB
ANION GAP SERPL CALCULATED.3IONS-SCNC: 3 MMOL/L (ref 3–14)
BASOPHILS # BLD AUTO: 0 10E3/UL (ref 0–0.2)
BASOPHILS NFR BLD AUTO: 1 %
BUN SERPL-MCNC: 13 MG/DL (ref 7–30)
CALCIUM SERPL-MCNC: 9.4 MG/DL (ref 8.5–10.1)
CHLORIDE BLD-SCNC: 107 MMOL/L (ref 94–109)
CO2 SERPL-SCNC: 27 MMOL/L (ref 20–32)
CREAT SERPL-MCNC: 0.89 MG/DL (ref 0.52–1.04)
EOSINOPHIL # BLD AUTO: 0.1 10E3/UL (ref 0–0.7)
EOSINOPHIL NFR BLD AUTO: 2 %
ERYTHROCYTE [DISTWIDTH] IN BLOOD BY AUTOMATED COUNT: 14.6 % (ref 10–15)
FLUAV RNA SPEC QL NAA+PROBE: NEGATIVE
FLUBV RNA RESP QL NAA+PROBE: NEGATIVE
GFR SERPL CREATININE-BSD FRML MDRD: 85 ML/MIN/1.73M2
GLUCOSE BLD-MCNC: 102 MG/DL (ref 70–99)
HCT VFR BLD AUTO: 47.2 % (ref 35–47)
HGB BLD-MCNC: 13.9 G/DL (ref 11.7–15.7)
IMM GRANULOCYTES # BLD: 0 10E3/UL
IMM GRANULOCYTES NFR BLD: 0 %
LYMPHOCYTES # BLD AUTO: 1.2 10E3/UL (ref 0.8–5.3)
LYMPHOCYTES NFR BLD AUTO: 30 %
MAGNESIUM SERPL-MCNC: 2.2 MG/DL (ref 1.6–2.3)
MCH RBC QN AUTO: 23.7 PG (ref 26.5–33)
MCHC RBC AUTO-ENTMCNC: 29.4 G/DL (ref 31.5–36.5)
MCV RBC AUTO: 80 FL (ref 78–100)
MONOCYTES # BLD AUTO: 0.4 10E3/UL (ref 0–1.3)
MONOCYTES NFR BLD AUTO: 10 %
NEUTROPHILS # BLD AUTO: 2.3 10E3/UL (ref 1.6–8.3)
NEUTROPHILS NFR BLD AUTO: 57 %
NRBC # BLD AUTO: 0 10E3/UL
NRBC BLD AUTO-RTO: 0 /100
PLATELET # BLD AUTO: 216 10E3/UL (ref 150–450)
POTASSIUM BLD-SCNC: 3.4 MMOL/L (ref 3.4–5.3)
RBC # BLD AUTO: 5.87 10E6/UL (ref 3.8–5.2)
RSV RNA SPEC NAA+PROBE: NEGATIVE
SARS-COV-2 RNA RESP QL NAA+PROBE: NEGATIVE
SODIUM SERPL-SCNC: 137 MMOL/L (ref 133–144)
WBC # BLD AUTO: 4 10E3/UL (ref 4–11)

## 2022-04-23 PROCEDURE — 83735 ASSAY OF MAGNESIUM: CPT | Performed by: EMERGENCY MEDICINE

## 2022-04-23 PROCEDURE — 87637 SARSCOV2&INF A&B&RSV AMP PRB: CPT | Performed by: EMERGENCY MEDICINE

## 2022-04-23 PROCEDURE — 80048 BASIC METABOLIC PNL TOTAL CA: CPT | Performed by: EMERGENCY MEDICINE

## 2022-04-23 PROCEDURE — 36415 COLL VENOUS BLD VENIPUNCTURE: CPT | Performed by: EMERGENCY MEDICINE

## 2022-04-23 PROCEDURE — C9803 HOPD COVID-19 SPEC COLLECT: HCPCS

## 2022-04-23 PROCEDURE — 258N000003 HC RX IP 258 OP 636: Performed by: EMERGENCY MEDICINE

## 2022-04-23 PROCEDURE — 96360 HYDRATION IV INFUSION INIT: CPT

## 2022-04-23 PROCEDURE — 85025 COMPLETE CBC W/AUTO DIFF WBC: CPT | Performed by: EMERGENCY MEDICINE

## 2022-04-23 PROCEDURE — 99283 EMERGENCY DEPT VISIT LOW MDM: CPT | Mod: 25

## 2022-04-23 RX ORDER — ONDANSETRON 4 MG/1
4 TABLET, ORALLY DISINTEGRATING ORAL EVERY 8 HOURS PRN
Qty: 10 TABLET | Refills: 0 | Status: SHIPPED | OUTPATIENT
Start: 2022-04-23 | End: 2022-04-26

## 2022-04-23 RX ADMIN — SODIUM CHLORIDE, POTASSIUM CHLORIDE, SODIUM LACTATE AND CALCIUM CHLORIDE 1000 ML: 600; 310; 30; 20 INJECTION, SOLUTION INTRAVENOUS at 11:06

## 2022-04-23 ASSESSMENT — ENCOUNTER SYMPTOMS
DIARRHEA: 1
APPETITE CHANGE: 1

## 2022-04-23 NOTE — ED TRIAGE NOTES
"A&Ox4. ABC's intact. Pt c/o diarrhea since Tuesday.  Has had N/V/D and started to have a cough yesterday.  States anything she eats or drinks \"goes right out\".  Has taken imodium and Pepto with no relief.  .  States she has diarrhea 3-4/hr.  Vomiting stopped on Wednesday.  Stools are black.  Denies pain.  "

## 2022-04-23 NOTE — ED PROVIDER NOTES
History   Chief Complaint:  Diarrhea and Nausea, Vomiting, & Diarrhea    The history is provided by the patient.      Shannan Savage is a 36 year old female with history of bipolar 1 disorder, obesity, hyperparathyroidism, and DVT who presents with diarrhea. The patient reports that she began experiencing diarrhea 4 days ago, per triage note, worsening last night. She states she was not able to eat anything on Wednesday. She reports that she is able to keep some foods down such as rice and potatoes, but still experiences the diarrhea. She notes that the diarrhea occurs about 4 times per hour. She endorses black stools which she attributes to Pepto-Bismol. Her last bowel movements were this morning at 0630 and 0845. She states she did not have any Pepto last night. She endorses a past cholecystectomy. She is currently on Imodium, Flexeril, and Sumatriptan; she takes Imodium daily for diarrhea. She denies any known sick contacts.     Review of Systems   Constitutional: Positive for appetite change (decreased).   Gastrointestinal: Positive for diarrhea.   All other systems reviewed and are negative.    Allergies:  Codeine  Codeine sulfate  Latex    Medications:  Citalopram  Flexeril  Colace  Norco  Midrin  Imitrex  Desyrel  Gabapentin  Saxenda  Lamotrigine  Drisdol  Benadryl   Zofran    Past Medical History:     Bipolar 1 disorder  Morbid obesity  Prediabetes  Migraine  DVT  Bilateral knee osteoarthritis  Hyperglycemia  Hyperparathyroidism  Anxiety  Sleep apnea      Past Surgical History:    Cholecystectomy  Left ankle surgery      Family History:    Father: mesothelioma, diabetes, hyperlipidemia  Mother: obesity, hypertension, depression  Sister: obesity    Social History:  Presents to the emergency department alone.  Arrives via car.    Physical Exam     Patient Vitals for the past 24 hrs:   BP Temp Temp src Pulse Resp SpO2 Weight   04/23/22 1226 -- -- -- -- -- 98 % --   04/23/22 1223 -- -- -- -- -- 99 % --    04/23/22 1215 -- -- -- -- -- 98 % --   04/23/22 1204 -- -- -- -- -- 96 % --   04/23/22 1200 122/86 -- -- 77 -- 97 % --   04/23/22 1130 -- -- -- -- -- 97 % --   04/23/22 1115 (!) 144/98 -- -- -- -- 96 % --   04/23/22 1102 (!) 155/94 -- -- 83 -- 97 % --   04/23/22 1100 (!) 155/94 -- -- -- -- 97 % --   04/23/22 1016 (!) 169/99 97.9  F (36.6  C) Oral 94 18 97 % (!) 199.3 kg (439 lb 6 oz)       Physical Exam  Constitutional: Vital signs reviewed as above.   HENT:               Head: No external signs of trauma noted.              Eyes: Conjunctivae are normal. Pupils are equal, round, and reactive to light.   Cardiovascular:               Normal rate, regular rhythm and normal heart sounds.                Exam reveals no friction rub.                No murmur heard.  Pulmonary/Chest:               Effort normal and breath sounds normal.               No respiratory distress.               There are no wheezes.               There are no rales.   Gastrointestinal:               Soft.               Bowel sounds normal.               There is no distension.               There is no tenderness on my exam.               There is no rebound or guarding.   Rectal: Patient declined  Musculoskeletal:               Normal range of motion.               Normal Tone  Neurological: Patient is alert and oriented to person, place, and time.   Skin: Skin is warm and dry. Patient is not diaphoretic  Psychiatric: The patient appears calm    Emergency Department Course       Laboratory:  Labs Ordered and Resulted from Time of ED Arrival to Time of ED Departure   BASIC METABOLIC PANEL - Abnormal       Result Value    Sodium 137      Potassium 3.4      Chloride 107      Carbon Dioxide (CO2) 27      Anion Gap 3      Urea Nitrogen 13      Creatinine 0.89      Calcium 9.4      Glucose 102 (*)     GFR Estimate 85     CBC WITH PLATELETS AND DIFFERENTIAL - Abnormal    WBC Count 4.0      RBC Count 5.87 (*)     Hemoglobin 13.9      Hematocrit  47.2 (*)     MCV 80      MCH 23.7 (*)     MCHC 29.4 (*)     RDW 14.6      Platelet Count 216      % Neutrophils 57      % Lymphocytes 30      % Monocytes 10      % Eosinophils 2      % Basophils 1      % Immature Granulocytes 0      NRBCs per 100 WBC 0      Absolute Neutrophils 2.3      Absolute Lymphocytes 1.2      Absolute Monocytes 0.4      Absolute Eosinophils 0.1      Absolute Basophils 0.0      Absolute Immature Granulocytes 0.0      Absolute NRBCs 0.0     MAGNESIUM - Normal    Magnesium 2.2     INFLUENZA A/B & SARS-COV2 PCR MULTIPLEX - Normal    Influenza A PCR Negative      Influenza B PCR Negative      RSV PCR Negative      SARS CoV2 PCR Negative     OCCULT BLOOD STOOL        Emergency Department Course:    Mental Health Risk Assessment      PSS-3    Date and Time Over the past 2 weeks have you felt down, depressed, or hopeless? Over the past 2 weeks have you had thoughts of killing yourself? Have you ever attempted to kill yourself? When did this last happen? User   04/23/22 1017 yes no yes more than 6 months ago MES                Item Assessment   Suicidal Ideation None           Reviewed:  I reviewed nursing notes, vitals, past medical history and Care Everywhere    Assessments/consults:  ED Course as of 04/23/22 1351   Sat Apr 23, 2022   1057 I obtained history and examined the patient as noted above.   1122 Rechecked.   1147 Rechecked. She was unable to have a BM. Pt declines rectal exam.   1222 Rechecked. Comfortable with DC (with Zofran).     Interventions:  1106 Lactated ringers 1000 mL    Disposition:  The patient was discharged to home.     Impression & Plan       Medical Decision Making:  This 36-year-old female patient presents to the ED due to concerns for diarrhea and vomiting.  Please see the HPI and exam for specifics.  The patient has remained stable in the ED.  Despite multiple reported episodes of diarrhea, her electrolytes and renal function appear reassuring.  The patient thought she  might be able to give us a stool sample here though she was unable to go when she went to the bathroom.  As she has otherwise been feeling well, I think that discharge is safe.  I will encourage her to follow with her primary care clinic in the outpatient setting and I will prescribe Zofran for her symptoms as well.  She can return with any new or worsening symptoms.    Critical Care Time: none    Diagnosis:    ICD-10-CM    1. Vomiting and diarrhea  R11.10     R19.7        Discharge Medications:  Discharge Medication List as of 4/23/2022 12:24 PM      START taking these medications    Details   ondansetron (ZOFRAN ODT) 4 MG ODT tab Take 1 tablet (4 mg) by mouth every 8 hours as needed for nausea or vomiting, Disp-10 tablet, R-0, E-Prescribe             Scribe Disclosure:  I, Yolanda Abdalla, am serving as a scribe at 10:57 AM on 4/23/2022 to document services personally performed by William Rich DO based on my observations and the provider's statements to me.            William Rich DO  04/23/22 0555

## 2022-04-23 NOTE — DISCHARGE INSTRUCTIONS
"What do you do next:   Continue your home medications unless we have specifically changed them  You may use the \"Zofran\" that I have prescribed for your nausea.  Take this as directed.  Try to stay hydrated and work on increasing your solid food intake.  Follow up as indicated below (if you need to follow with your primary care clinic and you are still having diarrhea, stool studies may be reasonable at that time).    When do you return: If you have uncontrollable pain, uncontrollable vomiting, bloody vomit or bloody stools, or any other symptoms that concern you, please return to the ED for reevaluation.    Thank you for allowing us to care for you today.    "

## 2022-06-16 ENCOUNTER — HOSPITAL ENCOUNTER (EMERGENCY)
Facility: CLINIC | Age: 37
Discharge: HOME | End: 2022-06-16
Attending: EMERGENCY MEDICINE | Admitting: EMERGENCY MEDICINE
Payer: COMMERCIAL

## 2022-06-16 VITALS
RESPIRATION RATE: 18 BRPM | OXYGEN SATURATION: 99 % | SYSTOLIC BLOOD PRESSURE: 139 MMHG | TEMPERATURE: 98.3 F | DIASTOLIC BLOOD PRESSURE: 64 MMHG | HEART RATE: 89 BPM

## 2022-06-16 DIAGNOSIS — S39.012A BACK STRAIN, INITIAL ENCOUNTER: ICD-10-CM

## 2022-06-16 PROCEDURE — 250N000013 HC RX MED GY IP 250 OP 250 PS 637: Performed by: EMERGENCY MEDICINE

## 2022-06-16 PROCEDURE — 99284 EMERGENCY DEPT VISIT MOD MDM: CPT

## 2022-06-16 RX ORDER — METHOCARBAMOL 750 MG/1
750 TABLET, FILM COATED ORAL 3 TIMES DAILY PRN
Qty: 15 TABLET | Refills: 0 | Status: SHIPPED | OUTPATIENT
Start: 2022-06-16 | End: 2022-06-21

## 2022-06-16 RX ORDER — IBUPROFEN 200 MG
600 TABLET ORAL EVERY 8 HOURS PRN
Qty: 60 TABLET | Refills: 0 | Status: SHIPPED | OUTPATIENT
Start: 2022-06-16 | End: 2022-10-09

## 2022-06-16 RX ORDER — METHOCARBAMOL 750 MG/1
750 TABLET, FILM COATED ORAL ONCE
Status: COMPLETED | OUTPATIENT
Start: 2022-06-16 | End: 2022-06-16

## 2022-06-16 RX ORDER — IBUPROFEN 600 MG/1
600 TABLET, FILM COATED ORAL ONCE
Status: COMPLETED | OUTPATIENT
Start: 2022-06-16 | End: 2022-06-16

## 2022-06-16 RX ADMIN — METHOCARBAMOL 750 MG: 750 TABLET ORAL at 19:11

## 2022-06-16 RX ADMIN — IBUPROFEN 600 MG: 600 TABLET, FILM COATED ORAL at 19:06

## 2022-06-16 ASSESSMENT — ENCOUNTER SYMPTOMS
DYSURIA: 0
FEVER: 0
BACK PAIN: 1

## 2022-06-16 NOTE — ED PROVIDER NOTES
History     Chief Complaint:  Back Pain     HPI:  The history is provided by the patient.      Shannan Savage is a 36 year old female with a history of DVT, diabetes mellitus, hypertension who presents with right-sided low back pain which began suddenly 2 days ago. She reports that 2 days ago, she was bending over and trying to move a heavy ceramic/glass table sideways. As she stood up from the position she was in, she experienced sudden onset of right-sided low back pain. Since this time, she has had significant pain which worsens when getting up from a seated/laying position and when twisting to the right, but not the left. The pain is non-radiating. For pain management, she has been taking Norco without any relief. She notes that she had similar pain 1.5 months ago which was due to a kidney infection. She is concerned that the pain she presents with today may be kidney-related; however, she has had no urinary symptoms including dysuria, urinary urgency, or decreased urine output. No fevers. She also mentions that she has a known history of herniated discs. She has never had any surgeries to her back, but she did have a cortisone injection 4 years ago. Shannan reports that she receives Depo injections every 3 months and had her most recent injection 1 week ago. She denies possibility of pregnancy. She notes that she takes Celexa and Lamictal for bipolar disorder and depression, and she feels like her medications have been adequately managing her mental health. She does not take any medications for hypertension. She mentions that her father passed away from an MI at the age of 67.    Review of Systems   Constitutional: Negative for fever.   Genitourinary: Negative for decreased urine volume, dysuria and urgency.   Musculoskeletal: Positive for back pain.   All other systems reviewed and are  negative.    Allergies:  Codeine  Latex    Medications:  Citalopram  Flexeril  Benadryl  Midrin  Imitrex  Trazodone  Gabapentin  Saxenda  Lamictal  Depo-Provera    Past Medical History:     Bipolar I disorder  Morbid obesity  Migraines  DVT  SHAHID  Diabetes mellitus   Anemia  Anxiety  Depression  Osteoarthritis  Hyperparathyroidism  Hypertension  Impaired fasting glucose  Cholelithiasis  Annie-Mckee tear    Past Surgical History:    Ankle surgery, left x2  Cholecystectomy  Bone graft    Family History:    Father: mesothelioma, diabetes, hyperlipidemia, MI  Mother: obesity, hypertension  Sister: obesity    Social History:  The patient presents to the ED alone.  The patient presents to the ED via car.     Physical Exam     Patient Vitals for the past 24 hrs:   BP Temp Temp src Pulse Resp SpO2   06/16/22 1910 (!) 140/104 -- -- 90 18 99 %   06/16/22 1852 (!) 179/138 -- -- -- -- --   06/16/22 1850 -- 98.3  F (36.8  C) Oral 94 16 98 %   06/16/22 1835 134/64 -- -- -- -- --   06/16/22 1815 130/71 -- -- -- -- --     Physical Exam  Constitutional: Vital signs reviewed as above.   Head: No external signs of trauma noted.  Eyes: Pupils are equal, round, and reactive to light.   Neck: No JVD noted  Cardiovascular: Normal rate, regular rhythm and normal heart sounds.  No murmur heard. Equal B/L peripheral pulses.  Pulmonary/Chest: Effort normal and breath sounds normal. No respiratory distress. Patient has no wheezes. Patient has no rales.   Gastrointestinal: Soft. There is no tenderness.   Musculoskeletal/Extremities: There is right low thoracic paraspinal tenderness.  There is right lumbar paraspinal tenderness.  There appears to be right gluteal/piriformis area tenderness.  Neurological: Patient is alert and oriented to person, place, and time.   Skin: Skin is warm and dry. There is no diaphoresis noted.   Psychiatric: The patient appears calm.      Emergency Department Course        Reviewed:  I reviewed nursing notes,  vitals, past medical history and Care Everywhere    Assessments/Consults:  ED Course as of 06/16/22 1958   Thu Jun 16, 2022 1900 Dr. Rich evaluated the patient and obtained history.   1949 The patient was rechecked and updated. She feels somewhat improved.     Interventions:  1906  Ibuprofen 600 mg PO  1911 Robaxin 750 mg PO    Disposition:  The patient was discharged to home.     Impression & Plan     CMS Diagnoses: None    Medical Decision Making:  This 36-year-old female patient presents to the ED due to right-sided back pain.  Please see the HPI and exam for specifics.  The nature of the patient's symptoms suggest a musculoskeletal strain.  The patient was quite hypertensive initially though that has improved spontaneously.  The patient does not have a history of hypertension nor does she currently take any antihypertensive medications.  As her symptoms improved, I do not think that further work-up of her initial hypertension is warranted.    As noted, the patient improved symptomatically and I felt she was safe for discharge.  I think that a muscle relaxer and stretching exercises could be helpful.  Ibuprofen could also be helpful though the patient was counseled about taking this in conjunction with her Celexa.  I think that for a short low-dose course this could be a reasonable plan but the patient should follow very closely in the outpatient setting.  Anticipatory guidance given prior to discharge.    Diagnosis:    ICD-10-CM    1. Back strain, initial encounter  S39.012A      Discharge Medications:  New Prescriptions    IBUPROFEN (ADVIL/MOTRIN) 200 MG TABLET    Take 3 tablets (600 mg) by mouth every 8 hours as needed for mild pain    METHOCARBAMOL (ROBAXIN) 750 MG TABLET    Take 1 tablet (750 mg) by mouth 3 times daily as needed for muscle spasms     Scribe Disclosure:  Katelynn LORA, am serving as a scribe at 6:55 PM on 6/16/2022 to document services personally performed by William Rich DO  based on my observations and the provider's statements to me.      William Rich,   06/16/22 2004

## 2022-06-16 NOTE — ED TRIAGE NOTES
Pt was sliding a heavy glass table across the living room.  When she stood up she felt pain in her right lower back no pain radiating down her legs. Pt has been taking norco at home with no improvement.     Triage Assessment     Row Name 06/16/22 1878       Triage Assessment (Adult)    Airway WDL WDL       Respiratory WDL    Respiratory WDL WDL

## 2022-06-17 NOTE — ED NOTES
Patient discharged home. Vital signs stable at discharge. Education provided regarding avs reviewed. Pt verbalized understanding. IV removed. Catheter intact. All questions answered.

## 2022-06-17 NOTE — DISCHARGE INSTRUCTIONS
What do you do next:   Continue your home medications unless we have specifically changed them  You should consider using over-the-counter ibuprofen.  Take 600 mg by mouth every 6-8 hours needed for fever or pain.    Taking this kind of medication with your citalopram can theoretically increase the likelihood of stomach upset or gastrointestinal irritation/bleeding.  Use this medication for a short duration as possible.  Take with food or milk to avoid stomach upset.    You can also use the muscle relaxer I have prescribed.  Do not drive while taking this medication until you know how it affects you.  Follow up as indicated below    When do you return: If you have uncontrollable pain, numbness or weakness of your leg, bowel or bladder incontinence, or any other symptoms that concern you, please return to the ED for reevaluation.    Thank you for allowing us to care for you today.

## 2022-07-31 ENCOUNTER — HEALTH MAINTENANCE LETTER (OUTPATIENT)
Age: 37
End: 2022-07-31

## 2022-10-09 ENCOUNTER — HOSPITAL ENCOUNTER (EMERGENCY)
Facility: CLINIC | Age: 37
Discharge: HOME | End: 2022-10-09
Attending: NURSE PRACTITIONER | Admitting: NURSE PRACTITIONER
Payer: COMMERCIAL

## 2022-10-09 VITALS
HEART RATE: 86 BPM | TEMPERATURE: 98.8 F | SYSTOLIC BLOOD PRESSURE: 155 MMHG | WEIGHT: 293 LBS | OXYGEN SATURATION: 100 % | DIASTOLIC BLOOD PRESSURE: 84 MMHG | RESPIRATION RATE: 20 BRPM | BODY MASS INDEX: 62.76 KG/M2

## 2022-10-09 DIAGNOSIS — J02.9 VIRAL PHARYNGITIS: ICD-10-CM

## 2022-10-09 LAB
DEPRECATED S PYO AG THROAT QL EIA: NEGATIVE
FLUAV RNA SPEC QL NAA+PROBE: NEGATIVE
FLUBV RNA RESP QL NAA+PROBE: NEGATIVE
RSV RNA SPEC NAA+PROBE: NEGATIVE
SARS-COV-2 RNA RESP QL NAA+PROBE: NEGATIVE

## 2022-10-09 PROCEDURE — 250N000013 HC RX MED GY IP 250 OP 250 PS 637: Performed by: EMERGENCY MEDICINE

## 2022-10-09 PROCEDURE — 87651 STREP A DNA AMP PROBE: CPT | Performed by: NURSE PRACTITIONER

## 2022-10-09 PROCEDURE — 87637 SARSCOV2&INF A&B&RSV AMP PRB: CPT | Performed by: NURSE PRACTITIONER

## 2022-10-09 PROCEDURE — 87637 SARSCOV2&INF A&B&RSV AMP PRB: CPT | Performed by: EMERGENCY MEDICINE

## 2022-10-09 PROCEDURE — 99284 EMERGENCY DEPT VISIT MOD MDM: CPT

## 2022-10-09 PROCEDURE — C9803 HOPD COVID-19 SPEC COLLECT: HCPCS

## 2022-10-09 PROCEDURE — 250N000011 HC RX IP 250 OP 636: Performed by: EMERGENCY MEDICINE

## 2022-10-09 RX ORDER — IBUPROFEN 100 MG/5ML
600 SUSPENSION, ORAL (FINAL DOSE FORM) ORAL ONCE
Status: COMPLETED | OUTPATIENT
Start: 2022-10-09 | End: 2022-10-09

## 2022-10-09 RX ORDER — DEXAMETHASONE 4 MG/1
12 TABLET ORAL ONCE
Status: COMPLETED | OUTPATIENT
Start: 2022-10-09 | End: 2022-10-09

## 2022-10-09 RX ORDER — ACETAMINOPHEN 325 MG/10.15ML
975 LIQUID ORAL ONCE
Status: COMPLETED | OUTPATIENT
Start: 2022-10-09 | End: 2022-10-09

## 2022-10-09 RX ADMIN — DEXAMETHASONE 12 MG: 4 TABLET ORAL at 23:56

## 2022-10-09 RX ADMIN — ACETAMINOPHEN 975 MG: 325 SOLUTION ORAL at 23:34

## 2022-10-09 RX ADMIN — IBUPROFEN 600 MG: 200 SUSPENSION ORAL at 23:34

## 2022-10-10 LAB — GROUP A STREP BY PCR: NOT DETECTED

## 2022-10-10 NOTE — RESULT ENCOUNTER NOTE
Group A Streptococcus PCR is NEGATIVE  No treatment or change in treatment [facility] ED lab result Strep Group A protocol.

## 2022-10-10 NOTE — ED PROVIDER NOTES
Visit Date: 10/09/2022    CHIEF COMPLAINT:  Sore throat.    HISTORY OF PRESENT ILLNESS:  This is a pleasant 36-year-old female with a history below, who presents with sore throat, runny nose, and mild cough for the past week.  She has taken 3 COVID tests and all have been negative.  She states it hurts to swallow, but is able to tolerate her secretions well.  She has had no fever.  No vomiting.  She has been feeling in the back of her throat and is concerned that she may be feeling a lump that she is concerned may be cancerous.  She has no history of unintentional weight loss, night sweats, fatigue or other constitutional symptoms of concern.    PAST MEDICAL HISTORY:     1.  Bipolar disorder.  2.  Prediabetes.  3.  Obesity.    PAST SURGICAL HISTORY:    1.  Left ankle surgery.  2.  Cholecystectomy.    MEDICATIONS:    1.  Citalopram.  2.  Flexeril.  3.  Midodrine.  4.  Imitrex.  5.  Trazodone.    ALLERGIES:    1.  CODEINE.  2.  LASIX.    SOCIAL HISTORY:  The patient presents to the Emergency Department by herself.  She does not smoke.    REVIEW OF SYSTEMS:  Pertinent 10-point review of systems is negative except for that noted in the HPI.    PHYSICAL EXAMINATION:    GENERAL:  The patient is sitting up comfortably in a chair in triage.  VITAL SIGNS:  Blood pressure 155/84, heart rate 86, respiratory rate 20, oxygen 100% on room air, temperature 98.8 degrees.  HEENT:  Atraumatic.  Moist mucous membranes.  She is tolerating her secretions well.  Normal phonation.  Mild erythema to the posterior oropharynx.  Uvula is midline.  No abscess or exudate.  I asked her to show me the lump that she is feeling and she palpates her left tonsil, which appears otherwise normal.    CARDIOVASCULAR:  Regular rhythm, normal rate.  No murmurs.  RESPIRATORY:  Clear to auscultation bilaterally without wheezes or rales.  SKIN:  No pertinent skin findings.  NEUROLOGIC:  The patient is alert and oriented x4.  PSYCHIATRIC:  The patient has  normal mood and affect.    LABORATORY EVALUATION AND DIAGNOSTIC STUDIES:  Rapid strep test is negative.  Strep culture is pending.    Viral swabs for COVID, influenza and RSV are all negative.    EMERGENCY DEPARTMENT COURSE AND MEDICAL DECISION MAKING:  This is a 36-year-old female who presents with cough and nasal congestion in the setting of sore throat.  This is almost certainly representative of a simple viral pharyngitis.  No evidence of epiglottitis, Raman's angina, peritonsillar abscess, bacterial tracheitis, etc.  She was concerned about a cancerous lesions, but I am very doubtful of this as the lump that she points to his her tonsil, which appears otherwise normal.  She has no lymphadenopathy or other constitutional symptoms of concern.  She received a dose of Decadron for symptomatic care and will be discharged home to followup with regular physician if her symptoms persist.    DIAGNOSIS:  Acute viral pharyngitis.    PLAN OF CARE:  As above.    Abdias Maher MD        D: 10/10/2022   T: 10/10/2022   MT: NERIS    Name:     MICHAEL PERALTA  MRN:      [id number]        Account:    [id number]   :      1985           Visit Date: 10/09/2022     Document: [id number]

## 2022-10-10 NOTE — ED TRIAGE NOTES
Pt arrives to the ED due to a sore throat for past week. Pt states that she has been trying dayquil and throat sprays at home without relief. Pt states it feels like there is a ball in her throat.  Pt has had 3 negative COVID tests. Cough earlier in week but that has resolved. No fevers.

## 2022-10-15 ENCOUNTER — HEALTH MAINTENANCE LETTER (OUTPATIENT)
Age: 37
End: 2022-10-15

## 2023-06-28 ENCOUNTER — APPOINTMENT (OUTPATIENT)
Dept: CT IMAGING | Facility: CLINIC | Age: 38
End: 2023-06-28
Attending: STUDENT IN AN ORGANIZED HEALTH CARE EDUCATION/TRAINING PROGRAM
Payer: COMMERCIAL

## 2023-06-28 ENCOUNTER — HOSPITAL ENCOUNTER (EMERGENCY)
Facility: CLINIC | Age: 38
Discharge: HOME | End: 2023-06-28
Attending: EMERGENCY MEDICINE | Admitting: EMERGENCY MEDICINE
Payer: COMMERCIAL

## 2023-06-28 VITALS
OXYGEN SATURATION: 98 % | DIASTOLIC BLOOD PRESSURE: 74 MMHG | HEART RATE: 76 BPM | HEIGHT: 70 IN | TEMPERATURE: 97.3 F | BODY MASS INDEX: 41.95 KG/M2 | WEIGHT: 293 LBS | RESPIRATION RATE: 19 BRPM | SYSTOLIC BLOOD PRESSURE: 154 MMHG

## 2023-06-28 DIAGNOSIS — R07.9 CHEST PAIN, UNSPECIFIED TYPE: ICD-10-CM

## 2023-06-28 DIAGNOSIS — R10.13 EPIGASTRIC PAIN: ICD-10-CM

## 2023-06-28 LAB
ALBUMIN SERPL BCG-MCNC: 3.7 G/DL (ref 3.5–5.2)
ALP SERPL-CCNC: 74 U/L (ref 35–104)
ALT SERPL W P-5'-P-CCNC: 12 U/L (ref 0–50)
ANION GAP SERPL CALCULATED.3IONS-SCNC: 10 MMOL/L (ref 7–15)
AST SERPL W P-5'-P-CCNC: 17 U/L (ref 0–45)
BILIRUB DIRECT SERPL-MCNC: <0.2 MG/DL (ref 0–0.3)
BILIRUB SERPL-MCNC: 0.2 MG/DL
BUN SERPL-MCNC: 9.9 MG/DL (ref 6–20)
CALCIUM SERPL-MCNC: 9.4 MG/DL (ref 8.6–10)
CHLORIDE SERPL-SCNC: 103 MMOL/L (ref 98–107)
CREAT SERPL-MCNC: 0.81 MG/DL (ref 0.51–0.95)
D DIMER PPP FEU-MCNC: 0.89 UG/ML FEU (ref 0–0.5)
DEPRECATED HCO3 PLAS-SCNC: 26 MMOL/L (ref 22–29)
ERYTHROCYTE [DISTWIDTH] IN BLOOD BY AUTOMATED COUNT: 14.7 % (ref 10–15)
GFR SERPL CREATININE-BSD FRML MDRD: >90 ML/MIN/1.73M2
GLUCOSE SERPL-MCNC: 100 MG/DL (ref 70–99)
HCG SERPL QL: NEGATIVE
HCT VFR BLD AUTO: 42.9 % (ref 35–47)
HGB BLD-MCNC: 13.1 G/DL (ref 11.7–15.7)
HOLD SPECIMEN: NORMAL
HOLD SPECIMEN: NORMAL
LIPASE SERPL-CCNC: 26 U/L (ref 13–60)
MCH RBC QN AUTO: 25 PG (ref 26.5–33)
MCHC RBC AUTO-ENTMCNC: 30.5 G/DL (ref 31.5–36.5)
MCV RBC AUTO: 82 FL (ref 78–100)
PLATELET # BLD AUTO: 259 10E3/UL (ref 150–450)
POTASSIUM SERPL-SCNC: 3.7 MMOL/L (ref 3.4–5.3)
PROT SERPL-MCNC: 6.9 G/DL (ref 6.4–8.3)
RBC # BLD AUTO: 5.24 10E6/UL (ref 3.8–5.2)
SODIUM SERPL-SCNC: 139 MMOL/L (ref 136–145)
TROPONIN T SERPL HS-MCNC: <6 NG/L
WBC # BLD AUTO: 7.9 10E3/UL (ref 4–11)

## 2023-06-28 PROCEDURE — 96374 THER/PROPH/DIAG INJ IV PUSH: CPT | Mod: 59

## 2023-06-28 PROCEDURE — 85027 COMPLETE CBC AUTOMATED: CPT | Performed by: EMERGENCY MEDICINE

## 2023-06-28 PROCEDURE — 93005 ELECTROCARDIOGRAM TRACING: CPT

## 2023-06-28 PROCEDURE — 80053 COMPREHEN METABOLIC PANEL: CPT | Performed by: EMERGENCY MEDICINE

## 2023-06-28 PROCEDURE — 36415 COLL VENOUS BLD VENIPUNCTURE: CPT | Performed by: EMERGENCY MEDICINE

## 2023-06-28 PROCEDURE — 271N000002 HC RX 271: Performed by: EMERGENCY MEDICINE

## 2023-06-28 PROCEDURE — 250N000009 HC RX 250: Performed by: EMERGENCY MEDICINE

## 2023-06-28 PROCEDURE — 84484 ASSAY OF TROPONIN QUANT: CPT | Performed by: STUDENT IN AN ORGANIZED HEALTH CARE EDUCATION/TRAINING PROGRAM

## 2023-06-28 PROCEDURE — 85014 HEMATOCRIT: CPT | Performed by: EMERGENCY MEDICINE

## 2023-06-28 PROCEDURE — 99285 EMERGENCY DEPT VISIT HI MDM: CPT | Mod: 25

## 2023-06-28 PROCEDURE — 82248 BILIRUBIN DIRECT: CPT | Performed by: EMERGENCY MEDICINE

## 2023-06-28 PROCEDURE — 250N000011 HC RX IP 250 OP 636: Performed by: EMERGENCY MEDICINE

## 2023-06-28 PROCEDURE — 84703 CHORIONIC GONADOTROPIN ASSAY: CPT | Performed by: EMERGENCY MEDICINE

## 2023-06-28 PROCEDURE — 74177 CT ABD & PELVIS W/CONTRAST: CPT

## 2023-06-28 PROCEDURE — 250N000011 HC RX IP 250 OP 636: Mod: JZ | Performed by: STUDENT IN AN ORGANIZED HEALTH CARE EDUCATION/TRAINING PROGRAM

## 2023-06-28 PROCEDURE — 83690 ASSAY OF LIPASE: CPT | Performed by: EMERGENCY MEDICINE

## 2023-06-28 PROCEDURE — 85379 FIBRIN DEGRADATION QUANT: CPT | Performed by: EMERGENCY MEDICINE

## 2023-06-28 RX ORDER — IOPAMIDOL 755 MG/ML
500 INJECTION, SOLUTION INTRAVASCULAR ONCE
Status: COMPLETED | OUTPATIENT
Start: 2023-06-28 | End: 2023-06-28

## 2023-06-28 RX ORDER — LIDOCAINE HYDROCHLORIDE 20 MG/ML
5 SOLUTION OROPHARYNGEAL ONCE
Status: COMPLETED | OUTPATIENT
Start: 2023-06-28 | End: 2023-06-28

## 2023-06-28 RX ADMIN — FAMOTIDINE 20 MG: 10 INJECTION, SOLUTION INTRAVENOUS at 17:35

## 2023-06-28 RX ADMIN — LIDOCAINE HYDROCHLORIDE 15 ML: 1 POWDER at 14:41

## 2023-06-28 RX ADMIN — SODIUM CHLORIDE 100 ML: 9 INJECTION, SOLUTION INTRAVENOUS at 18:07

## 2023-06-28 RX ADMIN — IOPAMIDOL 100 ML: 755 INJECTION, SOLUTION INTRAVENOUS at 18:07

## 2023-06-28 ASSESSMENT — ACTIVITIES OF DAILY LIVING (ADL)
ADLS_ACUITY_SCORE: 35

## 2023-06-28 NOTE — ED TRIAGE NOTES
Pt here with c/o continuous, mid-sternal CP and SOB x1.5 days. Denies relief from antacids. No cardiac hx. ABC intact.

## 2023-06-28 NOTE — ED PROVIDER NOTES
History     Chief Complaint:  Chest Pain     HPI   Shannan Savage is a 37 year old female who presents with 2 days of epigastric pain and chest pain. Initially, pain began yesterday after eating a bowl of spaghetti. She thought it was reflux so she tried antacids without relief.  A while later, she developed sharp pain in her chest that was about a 9 out of 10.  She was able to fall asleep and woke up this morning with the pain less severe at a 5 out of 10. However it then progressed and she felt the chest pain along with some shortness of breath and presented here.  Pain is worse with deep breaths.  The GI cocktail she received brought the pain down somewhat but it is still present.  Also reports 3 episodes of emesis this morning which is abnormal for her.  Denies diarrhea, hematuria, dysuria, recent illness, fevers or chills.  Notably, patient had DVT in her left lower extremity many years ago and was on blood thinners during her pregnancy 5 years ago.  No current blood thinners.  Denies leg swelling or pain.  Denies history of hypertension, hyperlipidemia, GERD.  She did have cholecystectomy many many years ago.  Denies other abdominal surgery.  Notably, patient reports her father and grandfather both had heart attacks in their 50s or 60s.    Independent Historian:   None - Patient Only    Review of External Notes:   I reviewed the patient's family medicine note from October 2022 with [facility].      Medications:    CITALOPRAM HYDROBROMIDE PO  Cyclobenzaprine HCl (FLEXERIL PO)  Isometheptene-Dichloral-APAP (MIDRIN PO)  SUMAtriptan Succinate Refill (IMITREX) 6 MG/0.5ML SOCT  traZODone (DESYREL) 50 MG tablet        Past Medical History:    Past Medical History:   Diagnosis Date     Bipolar 1 disorder      Morbid obesity      Prediabetes        Past Surgical History:    Past Surgical History:   Procedure Laterality Date     ANKLE SURGERY Left     2004 and then hardware removal in 2009     CHOLECYSTECTOMY   "01/01/2007        Physical Exam     Patient Vitals for the past 24 hrs:   BP Temp Temp src Pulse Resp SpO2 Height Weight   06/28/23 1732 -- -- -- 76 13 99 % -- --   06/28/23 1731 -- -- -- 65 12 100 % -- --   06/28/23 1730 123/77 -- -- 77 25 99 % -- --   06/28/23 1729 125/67 -- -- 73 (!) 38 99 % -- --   06/28/23 1714 -- -- -- 74 12 98 % -- --   06/28/23 1659 -- -- -- 78 18 98 % -- --   06/28/23 1440 (!) 145/91 -- -- 79 -- 96 % -- --   06/28/23 1412 (!) 199/103 -- -- -- -- -- 1.778 m (5' 10\") (!) 208.7 kg (460 lb)   06/28/23 1411 -- 97.3  F (36.3  C) Temporal 76 18 99 % -- --        Physical Exam  Vital signs and nursing notes reviewed.    General:  Alert and oriented, no acute distress.   Skin: Skin is warm and dry. No rashes, lesions, or erythema. No diaphoresis.  HEENT:   Head: Normocephalic, atraumatic. Facial features symmetric.   Eyes: Conjunctiva pink, sclera white. EOMs grossly intact.   Ears: Auricles without lesion, erythema, or edema.   Nose: Symmetric with no discharge.  Mouth and throat: Lips are moist with no lesions or edema, Buccal and oropharyngeal mucosa is pink and moist without lesions or exudate. Uvula is midline.  Neck: Normal range of motion. Neck supple with no lymphadenopathy. No tracheal deviation.   CV:  Heart RRR with no murmurs or extra heart sounds. 2+ radial and tibialis posterior pulses bilaterally. No peripheral edema.  Pulm/Chest: Chest wall expansion symmetric with no increased effort of breathing. Lungs clear and equal to auscultation bilaterally.   Abd:  Abdomen is soft. Moderately tender to palpation in epigastric region, otherwise nontender in all 4 quadrants with no guarding or rebound.   M/S: Moves all extremities spontaneously.  Psych: Normal mood and affect. Behavior is normal.     Emergency Department Course   ECG  ECG taken at 1451, ECG read at 1716  Normal sinus rhythm   Normal ECG   Rate 74 bpm. FL interval 148 ms. QRS duration 78 ms. QT/QTc 352/390 ms. P-R-T axes 53 46 " 31.     Imaging:  CT Chest (PE) Abdomen Pelvis w Contrast   Final Result   IMPRESSION:   1.  Negative for acute pulmonary emboli.      2.  No acute findings to explain the patient's pain.      3.  Diverticulosis in the colon but no convincing evidence for acute diverticulitis. Artifact from large body habitus.         Report per radiology    Laboratory:  Labs Ordered and Resulted from Time of ED Arrival to Time of ED Departure   CBC WITH PLATELETS - Abnormal       Result Value    WBC Count 7.9      RBC Count 5.24 (*)     Hemoglobin 13.1      Hematocrit 42.9      MCV 82      MCH 25.0 (*)     MCHC 30.5 (*)     RDW 14.7      Platelet Count 259     BASIC METABOLIC PANEL - Abnormal    Sodium 139      Potassium 3.7      Chloride 103      Carbon Dioxide (CO2) 26      Anion Gap 10      Urea Nitrogen 9.9      Creatinine 0.81      Calcium 9.4      Glucose 100 (*)     GFR Estimate >90     D DIMER QUANTITATIVE - Abnormal    D-Dimer Quantitative 0.89 (*)    HEPATIC FUNCTION PANEL - Normal    Protein Total 6.9      Albumin 3.7      Bilirubin Total 0.2      Alkaline Phosphatase 74      AST 17      ALT 12      Bilirubin Direct <0.20     LIPASE - Normal    Lipase 26     HCG QUALITATIVE PREGNANCY - Normal    hCG Serum Qualitative Negative     TROPONIN T, HIGH SENSITIVITY - Normal    Troponin T, High Sensitivity <6          Procedures   None    Emergency Department Course & Assessments:    PSS-3    Date and Time Over the past 2 weeks have you felt down, depressed, or hopeless? Over the past 2 weeks have you had thoughts of killing yourself? Have you ever attempted to kill yourself? When did this last happen? User   06/28/23 1412 no no yes more than 6 months ago AV              Suicide assessment completed by mental health (D.E.C., LCSW, etc.)    Interventions:  Medications   lidocaine (viscous) (XYLOCAINE) 2 % solution 5 mL (15 mLs Mouth/Throat $Given 6/28/23 1441)   famotidine (PEPCID) injection 20 mg (20 mg Intravenous $Given  6/28/23 1735)   iopamidol (ISOVUE-370) solution 500 mL (100 mLs Intravenous $Given 6/28/23 1807)   CT scan flush (100 mLs Intravenous $Given 6/28/23 1807)      Independent Interpretation (X-rays, CTs, rhythm strip):  None    Consultations/Discussion of Management or Tests:  None     Assessments:  1700 I initially assessed the patient and obtained the above history and physical exam.  ED Course as of 06/28/23 1957 Wed Jun 28, 2023 1900 I reassessed the patient and reviewed results.   1910 Dr. Bell assessed the patient       Social Determinants of Health affecting care:   None    Disposition:  The patient was discharged to home.     Impression & Plan    CMS Diagnoses: None    Medical Decision Making:  Shannan Savage is a 37-year-old female who presents to the emergency department with chest pain and epigastric pain since yesterday after eating a large bowl of spaghetti.  See HPI.  Hypertensive, other vital signs stable.  On exam, patient had some mild epigastric tenderness and a pleuritic component to her pain. ACS was considered. EKG showed normal sinus with no signs of acute ischemic injury or infarct. Troponin is <6, and according to [facility] pathway for high-sensitivity troponin, patient can be ruled out for myocardial injury at this time.  Given patient's history of DVT, pulmonary Embolism was considered. D-dimer was slightly elevated at 0.89, and CT PE study chest and abdomen was obtained. Fortunately, no evidence of acute pulmonary emboli and imaging was negative for other chest or abdominal etiology causing her pain.  She had a cholecystectomy many years ago.  No evidence for pancreatitis on imaging.  Remainder of laboratory tests were unremarkable including BMP without electrolyte, renal, or metabolic abnormalities.  CBC without leukocytosis or anemia, normal lipase and negative pregnancy.  Normal LFTs. Symptoms are improved in the emergency department with GI cocktail and famotidine.  The  differential diagnosis of chest pain is broad and includes life threatening etiologies such as Acute coronary syndrome, Myocardial infarction, Pulmonary Embolism, and Acute Aortic Dissection.  Other causes may include pneumonia, pneumothorax, pericarditis, pleurisy, and esophageal spasm.  No serious etiology for the chest pain was detected today during this visit.   I suspect her pain is gastrointestinal in etiology.  We will trial PPI for 30 days and have her follow up closely with primary care should symptoms continue.  May warrant further work up with GI down the line.  This was made clear to Shannan, who understands and is agreeable to plan.  Patient is safe to discharge home at this time but should return to the emergency department should they develop worsening pain, difficulty breathing, intractable vomiting, fevers or other concerning symptoms.    I staffed this patient with Dr. Bell who is agreeable to the history and plan above.    Critical Care time:  was 0 minutes for this patient excluding procedures.    Diagnosis:    ICD-10-CM    1. Chest pain, unspecified type  R07.9       2. Epigastric pain  R10.13            Discharge Medications:  Discharge Medication List as of 6/28/2023  7:24 PM      START taking these medications    Details   omeprazole (PRILOSEC) 20 MG DR capsule Take 1 capsule (20 mg) by mouth daily for 30 days, Disp-30 capsule, R-0, E-Prescribe            Little Hernandez PA-C  6/28/2023          Little Hernandez PA-C  06/28/23 2007

## 2023-06-28 NOTE — ED PROVIDER NOTES
"ED ATTENDING PHYSICIAN NOTE:   I evaluated this patient in conjunction with CHLOE Chappell. I have participated in the care of the patient and personally performed key elements of the history, exam, and medical decision making.      HPI:   Shannan Savage is a 37 year old female presents with 2 days of epigastric abdominal pain and lower chest discomfort.  With reflux and did try antacids with minimal improvement.  Denies personal history of heart attack but does note prior history of DVT associated with estrogen use.  Is not currently on anticoagulation.  No personal history of hypertension or hyperlipidemia.     EXAM:   BP (!) 113/105   Pulse 71   Temp 97.3  F (36.3  C) (Temporal)   Resp 19   Ht 1.778 m (5' 10\")   Wt (!) 208.7 kg (460 lb)   SpO2 98%   BMI 66.00 kg/m    General: Alert, appears well-developed and well-nourished. Cooperative.     In no acute distress  HEENT:  Head:  Atraumatic  Ears:  External ears are normal  Mouth/Throat:  Oropharynx is without erythema or exudate and mucous membranes are moist.   Eyes:   Conjunctivae normal and EOM are normal. No scleral icterus.  CV:  Normal rate, regular rhythm, normal heart sounds and radial pulses are 2+ and symmetric.  No murmur.  Resp:  Breath sounds are clear bilaterally    Non-labored, no retractions or accessory muscle use  GI:  Morbid obesity. Abdomen is soft, no distension, no tenderness. No rebound or guarding.  No CVA tenderness bilaterally  MS:  Normal range of motion. No edema.    Back atraumatic.    No midline cervical, thoracic, or lumbar tenderness  Skin:  Warm and dry.  No rash or lesions noted.  Neuro: Alert. Normal strength.  GCS: 15  Psych:  Normal mood and affect.    Labs Ordered and Resulted from Time of ED Arrival to Time of ED Departure   CBC WITH PLATELETS - Abnormal       Result Value    WBC Count 7.9      RBC Count 5.24 (*)     Hemoglobin 13.1      Hematocrit 42.9      MCV 82      MCH 25.0 (*)     MCHC 30.5 (*)     RDW 14.7   "    Platelet Count 259     BASIC METABOLIC PANEL - Abnormal    Sodium 139      Potassium 3.7      Chloride 103      Carbon Dioxide (CO2) 26      Anion Gap 10      Urea Nitrogen 9.9      Creatinine 0.81      Calcium 9.4      Glucose 100 (*)     GFR Estimate >90     D DIMER QUANTITATIVE - Abnormal    D-Dimer Quantitative 0.89 (*)    HEPATIC FUNCTION PANEL - Normal    Protein Total 6.9      Albumin 3.7      Bilirubin Total 0.2      Alkaline Phosphatase 74      AST 17      ALT 12      Bilirubin Direct <0.20     LIPASE - Normal    Lipase 26     HCG QUALITATIVE PREGNANCY - Normal    hCG Serum Qualitative Negative     TROPONIN T, HIGH SENSITIVITY - Normal    Troponin T, High Sensitivity <6       CT Chest (PE) Abdomen Pelvis w Contrast   Final Result   IMPRESSION:   1.  Negative for acute pulmonary emboli.      2.  No acute findings to explain the patient's pain.      3.  Diverticulosis in the colon but no convincing evidence for acute diverticulitis. Artifact from large body habitus.        MEDICAL DECISION MAKING/ASSESSMENT AND PLAN:   Shannan Savage is a 37 year old female who presents with chest & epigastric pain.  The work up in the Emergency Department is negative.  I considered a broad differential diagnosis in this patient including life-threatening etiologies such as acute coronary syndrome, myocardial infarction, pulmonary embolism, acute aortic dissection, myocarditis, pericarditis, acute valvular insufficiency amongst others.  Other causes considered for this patient included pneumonia, pneumothorax, chest wall source, pericarditis, pleurisy, esophageal spasm, etc.  No serious etiology for the chest pain were detected today during this visit.  High suspicion for potential gastritis versus GERD.  Will trial course of omeprazole with close outpatient follow-up with primary care.  Referral to GI may be necessary if symptoms or not improving.  Thankfully no sinister pathologies requiring further emergent work-up  or hospitalization.  After all questions answered & return precautions understood, discharged home.     DIAGNOSIS:     ICD-10-CM    1. Chest pain, unspecified type  R07.9       2. Epigastric pain  R10.13         DISPOSITION:   Discharged to home.      6/28/2023  [facility] EMERGENCY DEPT       Omer Bell MD  06/28/23 3955

## 2023-06-29 LAB
ATRIAL RATE - MUSE: 74 BPM
DIASTOLIC BLOOD PRESSURE - MUSE: NORMAL MMHG
INTERPRETATION ECG - MUSE: NORMAL
P AXIS - MUSE: 53 DEGREES
PR INTERVAL - MUSE: 148 MS
QRS DURATION - MUSE: 78 MS
QT - MUSE: 352 MS
QTC - MUSE: 390 MS
R AXIS - MUSE: 46 DEGREES
SYSTOLIC BLOOD PRESSURE - MUSE: NORMAL MMHG
T AXIS - MUSE: 31 DEGREES
VENTRICULAR RATE- MUSE: 74 BPM

## 2023-06-29 NOTE — DISCHARGE INSTRUCTIONS
Omeprazole daily for 30 days  Follow up with primary care in the next week   Return to the emergency department for worsening chest pain, shortness of breath, fevers, intractable vomiting, or any other concerns.

## 2024-01-01 NOTE — ED NOTES
"Left sided \"rib area pain\". Some shortness of breath. Recently seen in ED for possible miscarriage and would like hcg levels re-checked; she states she is 6 weeks pregnant.  "
English

## 2024-01-07 ENCOUNTER — HEALTH MAINTENANCE LETTER (OUTPATIENT)
Age: 39
End: 2024-01-07

## 2024-03-17 ENCOUNTER — HEALTH MAINTENANCE LETTER (OUTPATIENT)
Age: 39
End: 2024-03-17

## 2024-10-19 ENCOUNTER — HOSPITAL ENCOUNTER (EMERGENCY)
Facility: CLINIC | Age: 39
Discharge: HOME | End: 2024-10-19
Attending: EMERGENCY MEDICINE | Admitting: EMERGENCY MEDICINE
Payer: COMMERCIAL

## 2024-10-19 ENCOUNTER — APPOINTMENT (OUTPATIENT)
Dept: ULTRASOUND IMAGING | Facility: CLINIC | Age: 39
End: 2024-10-19
Attending: EMERGENCY MEDICINE
Payer: COMMERCIAL

## 2024-10-19 VITALS
HEIGHT: 71 IN | HEART RATE: 91 BPM | OXYGEN SATURATION: 98 % | WEIGHT: 293 LBS | DIASTOLIC BLOOD PRESSURE: 76 MMHG | RESPIRATION RATE: 18 BRPM | TEMPERATURE: 97 F | BODY MASS INDEX: 41.02 KG/M2 | SYSTOLIC BLOOD PRESSURE: 125 MMHG

## 2024-10-19 DIAGNOSIS — I83.90 VARICOSE VEINS OF CALF: ICD-10-CM

## 2024-10-19 DIAGNOSIS — M79.604 RIGHT LEG PAIN: ICD-10-CM

## 2024-10-19 DIAGNOSIS — R03.0 ELEVATED BLOOD PRESSURE READING: ICD-10-CM

## 2024-10-19 PROCEDURE — 93971 EXTREMITY STUDY: CPT | Mod: RT

## 2024-10-19 PROCEDURE — 99284 EMERGENCY DEPT VISIT MOD MDM: CPT | Mod: 25

## 2024-10-19 ASSESSMENT — COLUMBIA-SUICIDE SEVERITY RATING SCALE - C-SSRS
6. HAVE YOU EVER DONE ANYTHING, STARTED TO DO ANYTHING, OR PREPARED TO DO ANYTHING TO END YOUR LIFE?: NO
2. HAVE YOU ACTUALLY HAD ANY THOUGHTS OF KILLING YOURSELF IN THE PAST MONTH?: NO
1. IN THE PAST MONTH, HAVE YOU WISHED YOU WERE DEAD OR WISHED YOU COULD GO TO SLEEP AND NOT WAKE UP?: NO

## 2024-10-19 ASSESSMENT — ACTIVITIES OF DAILY LIVING (ADL): ADLS_ACUITY_SCORE: 35

## 2024-10-19 NOTE — ED TRIAGE NOTES
Pt arrives for R calf pain x2 days. Hx of blood clot many years ago. No pain relief with robaxin. Hypertensive in triage, pt states she is nervous.

## 2024-10-19 NOTE — ED PROVIDER NOTES
"  Emergency Department Note      History of Present Illness     Chief Complaint  Leg Pain    HPI  Shannan Savage is a 38 year old female who presents to the emergency room with what appears to be right lower leg pain, specifically in the posterior aspect of the calf.  She states that started 2 days ago while walking around a store, called a family ember who is also a nurse who recommended coming here to make sure she does not have a blood clot.  She did have a blood clot 15 years ago and it was felt to be provoked due to estrogen she was taking at the time.  Patient denies any trauma, does have pain on palpation she states along the posterior aspect of her right calf.  No other new activities      Independent Historian  No     Review of External Notes  Yes I have reviewed the patient's last office visit where the patient was seen in the urgent care setting for cough and wheezing and fever on 20 May of this year.      Past Medical History   Medical History and Problem List  Past Medical History:   Diagnosis Date    Bipolar 1 disorder     Morbid obesity     Prediabetes        Medications  CITALOPRAM HYDROBROMIDE PO  Cyclobenzaprine HCl (FLEXERIL PO)  Isometheptene-Dichloral-APAP (MIDRIN PO)  SUMAtriptan Succinate Refill (IMITREX) 6 MG/0.5ML SOCT  traZODone (DESYREL) 50 MG tablet        Surgical History   Past Surgical History:   Procedure Laterality Date    ANKLE SURGERY Left     2004 and then hardware removal in 2009    CHOLECYSTECTOMY  01/01/2007         Physical Exam   Patient Vitals for the past 24 hrs:   BP Temp Pulse Resp SpO2 Height Weight   10/19/24 1833 (!) 217/106 -- -- -- -- -- --   10/19/24 1831 -- 97  F (36.1  C) 103 20 95 % 1.803 m (5' 11\") (!) 202.5 kg (446 lb 6.9 oz)       Physical Exam  Vitals: reviewed by me  General: Pt seen on [facility], pleasant, cooperative, and alert to conversation.  Morbidly obese, does however have tenderness to palpation to the posterior aspect of the right " calf  Eyes: Tracking well, clear conjunctiva BL  ENT: MMM, midline trachea.   Lungs: No tachypnea, no accessory muscle use. No respiratory distress.   CV: Rate as above  MSK: no joint effusion.  No evidence of trauma, though no skin changes in this area.  Notable varicosities noted on both lower extremities, both legs are symmetric, no skin changes, no redness, no cellulitis or fluctuance or abscess.  Skin: No rash  Neuro: Clear speech and no facial droop.  Psych: Not RIS, no e/o AH/VH      Diagnostics   Lab Results   Labs Ordered and Resulted from Time of ED Arrival to Time of ED Departure - No data to display    Imaging  US Lower Extremity Venous Duplex Right   Final Result   IMPRESSION:   1.  No deep venous thrombosis in the right lower extremity.            ED Course          Optional/Additional Documentation  None      Medical Decision Making / Diagnosis       MDM  This is a very pleasant 38-year-old female who presents to the emergency room with atraumatic leg pain of her right leg.  She does have to minimal tenderness around the calf, but I do not see any evidence of infection or trauma.  Her ultrasound does not show any evidence of a DVT either and I do not think that antibiotics or labs or admission to the hospital or additional imaging would be helpful at this time.  We talked about how this may be due to a musculoskeletal cause and certain remedies for this over-the-counter that she can try.  She is very reassured with this workup being negative she tells me, and I have asked her to follow-up with her regular doctor in a week ahead.  Red flags for any come back to the ER were discussed in detail, she is aware that her blood pressure was very elevated and this needs to be looked at as well.  Will plan for discharge as above.      ICD-10 Codes:    ICD-10-CM    1. Elevated blood pressure reading  R03.0       2. Right leg pain  M79.604       3. 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

## 2024-10-20 NOTE — DISCHARGE INSTRUCTIONS
As we discussed, no clear cause of your pain was found today, though the ultrasound does not show any evidence of a blood clot and I do not see any evidence of an infection.  Please do use over-the-counter medications to help with your symptoms, and if you develop any swelling in either leg please do use compression stockings.  Please also come back to the ER immediately with any worsening symptoms, or any new concerns that you have.  Your blood pressure is very high here today, and as long as you do not have any symptoms, you are safe to follow with your regular doctor in the next 2 to 3 days to set up an appointment and get better control of your blood pressure.  Come back to the ER immediately with any other medical concerns that you have.

## 2024-11-25 ENCOUNTER — HOSPITAL ENCOUNTER (EMERGENCY)
Facility: CLINIC | Age: 39
Discharge: HOME | End: 2024-11-25
Attending: EMERGENCY MEDICINE | Admitting: EMERGENCY MEDICINE
Payer: COMMERCIAL

## 2024-11-25 ENCOUNTER — APPOINTMENT (OUTPATIENT)
Dept: ULTRASOUND IMAGING | Facility: CLINIC | Age: 39
End: 2024-11-25
Attending: EMERGENCY MEDICINE
Payer: COMMERCIAL

## 2024-11-25 VITALS
WEIGHT: 293 LBS | HEIGHT: 71 IN | SYSTOLIC BLOOD PRESSURE: 152 MMHG | OXYGEN SATURATION: 97 % | RESPIRATION RATE: 18 BRPM | TEMPERATURE: 97 F | HEART RATE: 85 BPM | BODY MASS INDEX: 41.02 KG/M2 | DIASTOLIC BLOOD PRESSURE: 97 MMHG

## 2024-11-25 DIAGNOSIS — I82.4Y1 ACUTE DEEP VEIN THROMBOSIS (DVT) OF PROXIMAL VEIN OF RIGHT LOWER EXTREMITY (H): ICD-10-CM

## 2024-11-25 LAB
ANION GAP SERPL CALCULATED.3IONS-SCNC: 16 MMOL/L (ref 7–15)
BASOPHILS # BLD AUTO: 0.1 10E3/UL (ref 0–0.2)
BASOPHILS NFR BLD AUTO: 1 %
BUN SERPL-MCNC: 7.9 MG/DL (ref 6–20)
CALCIUM SERPL-MCNC: 9.6 MG/DL (ref 8.8–10.4)
CHLORIDE SERPL-SCNC: 104 MMOL/L (ref 98–107)
CREAT SERPL-MCNC: 0.83 MG/DL (ref 0.51–0.95)
EGFRCR SERPLBLD CKD-EPI 2021: >90 ML/MIN/1.73M2
EOSINOPHIL # BLD AUTO: 0.4 10E3/UL (ref 0–0.7)
EOSINOPHIL NFR BLD AUTO: 5 %
ERYTHROCYTE [DISTWIDTH] IN BLOOD BY AUTOMATED COUNT: 15.6 % (ref 10–15)
GLUCOSE SERPL-MCNC: 92 MG/DL (ref 70–99)
HCO3 SERPL-SCNC: 22 MMOL/L (ref 22–29)
HCT VFR BLD AUTO: 42.6 % (ref 35–47)
HGB BLD-MCNC: 13 G/DL (ref 11.7–15.7)
HOLD SPECIMEN: NORMAL
IMM GRANULOCYTES # BLD: 0 10E3/UL
IMM GRANULOCYTES NFR BLD: 0 %
LYMPHOCYTES # BLD AUTO: 2.6 10E3/UL (ref 0.8–5.3)
LYMPHOCYTES NFR BLD AUTO: 31 %
MCH RBC QN AUTO: 24 PG (ref 26.5–33)
MCHC RBC AUTO-ENTMCNC: 30.5 G/DL (ref 31.5–36.5)
MCV RBC AUTO: 79 FL (ref 78–100)
MONOCYTES # BLD AUTO: 0.4 10E3/UL (ref 0–1.3)
MONOCYTES NFR BLD AUTO: 5 %
NEUTROPHILS # BLD AUTO: 5 10E3/UL (ref 1.6–8.3)
NEUTROPHILS NFR BLD AUTO: 58 %
NRBC # BLD AUTO: 0 10E3/UL
NRBC BLD AUTO-RTO: 0 /100
PLATELET # BLD AUTO: 275 10E3/UL (ref 150–450)
POTASSIUM SERPL-SCNC: 3.5 MMOL/L (ref 3.4–5.3)
RBC # BLD AUTO: 5.41 10E6/UL (ref 3.8–5.2)
SODIUM SERPL-SCNC: 142 MMOL/L (ref 135–145)
WBC # BLD AUTO: 8.5 10E3/UL (ref 4–11)

## 2024-11-25 PROCEDURE — 250N000013 HC RX MED GY IP 250 OP 250 PS 637: Performed by: EMERGENCY MEDICINE

## 2024-11-25 PROCEDURE — 85004 AUTOMATED DIFF WBC COUNT: CPT | Performed by: EMERGENCY MEDICINE

## 2024-11-25 PROCEDURE — 99284 EMERGENCY DEPT VISIT MOD MDM: CPT | Mod: 25

## 2024-11-25 PROCEDURE — 80048 BASIC METABOLIC PNL TOTAL CA: CPT | Performed by: EMERGENCY MEDICINE

## 2024-11-25 PROCEDURE — 36415 COLL VENOUS BLD VENIPUNCTURE: CPT | Performed by: EMERGENCY MEDICINE

## 2024-11-25 PROCEDURE — 82374 ASSAY BLOOD CARBON DIOXIDE: CPT | Performed by: EMERGENCY MEDICINE

## 2024-11-25 PROCEDURE — 93971 EXTREMITY STUDY: CPT | Mod: RT

## 2024-11-25 RX ADMIN — RIVAROXABAN 15 MG: 15 TABLET, FILM COATED ORAL at 22:25

## 2024-11-25 ASSESSMENT — ACTIVITIES OF DAILY LIVING (ADL)
ADLS_ACUITY_SCORE: 41

## 2024-11-25 NOTE — ED TRIAGE NOTES
Pt complains of right leg pain, numbness, and burning.  Pt does have swelling but denies pain on palpation. Pt was seen about a month ago for a pulled muscle in the same leg.  Pt does have hx of dvt 21 years ago.  Pt states that this pain started Thursday and Friday of last week when she was on a long car trip.  PT denies SOB or chest pain.       Triage Assessment (Adult)       Row Name 11/25/24 0823          Triage Assessment    Airway WDL WDL        Respiratory WDL    Respiratory WDL WDL        Skin Circulation/Temperature WDL    Skin Circulation/Temperature WDL WDL        Cardiac WDL    Cardiac WDL WDL        Peripheral/Neurovascular WDL    Peripheral Neurovascular WDL WDL        Cognitive/Neuro/Behavioral WDL    Cognitive/Neuro/Behavioral WDL WDL

## 2024-11-26 NOTE — ED PROVIDER NOTES
Emergency Department Note      History of Present Illness     Chief Complaint   Leg Pain      HPI   Shannan Savage is a 38 year old female who presents for evaluation of leg pain, leg swelling and numbness to the medial aspect of the right lower leg.  Symptoms started 1 week ago.  The patient has been driving several hours to [place] and back with her mother during this time.  She noted onset of numbness to the medial calf followed by intermittent shooting pains which are burning sensation.  This has been associated with swelling of the calf region in this area.  She denies any trauma.  No weakness.  No rash.  No redness.  She has a history of DVT in the past but is not currently anticoagulated or on any antiplatelets.  She otherwise feels well.  No chest pain shortness of breath, pleuritic pain.  No lightheadedness or dizziness.  Patient has been elevating the leg however the symptoms have not improved and she presents for evaluation.  She was initially seen at urgent care but then was referred here for ultrasound.    Independent Historian   None    Review of External Notes   None    Past Medical History     Medical History and Problem List   Past Medical History:   Diagnosis Date    Bipolar 1 disorder     Morbid obesity     Prediabetes        Medications   rivaroxaban ANTICOAGULANT (XARELTO) 15 MG TABS tablet  rivaroxaban ANTICOAGULANT (XARELTO) 20 MG TABS tablet  CITALOPRAM HYDROBROMIDE PO  Cyclobenzaprine HCl (FLEXERIL PO)  Isometheptene-Dichloral-APAP (MIDRIN PO)  SUMAtriptan Succinate Refill (IMITREX) 6 MG/0.5ML SOCT  traZODone (DESYREL) 50 MG tablet        Surgical History   Past Surgical History:   Procedure Laterality Date    ANKLE SURGERY Left     2004 and then hardware removal in 2009    CHOLECYSTECTOMY  01/01/2007       Physical Exam     Patient Vitals for the past 24 hrs:   BP Temp Temp src Pulse Resp SpO2 Height Weight   11/25/24 1728 (!) 151/113 97  F (36.1  C) Temporal 86 20 98 % 1.803 m  "(5' 11\") (!) 201.6 kg (444 lb 7.2 oz)     Physical Exam  General: Resting comfortably  Head:  Scalp, face, and head appear normal  Eyes:  Pupils equal, round    Conjunctivae noninjected and sclera white  ENT:    The nose is normal    Ears/pinnae are normal  CV:  RRR, no M/R/G  Resp:  CTAB, no increased WOB  Neck:  Normal range of motion  MSK:  Right lower extremity non tender to palpation. Compartments of the lower leg soft and nontender. No erythema induration or crepitus. Knee is normal without effusion. No popliteal fullness or tenderness to palpation. CMS intact distally in the right lower extremity/foot. DP pulse 2+ and normal. Ankle normal. Mild subjective decreased sensation over the medial calf but no dense anesthesia. Normal motor function distal right lower extremity.   Skin:  No rash or lesions noted.  Neuro:  Speech is normal and fluent    Moves all extremities spontaneously  Psych: Awake, Alert. Normal affect      Appropriate interactions          Diagnostics     Lab Results   Labs Ordered and Resulted from Time of ED Arrival to Time of ED Departure   CBC WITH PLATELETS AND DIFFERENTIAL - Abnormal       Result Value    WBC Count 8.5      RBC Count 5.41 (*)     Hemoglobin 13.0      Hematocrit 42.6      MCV 79      MCH 24.0 (*)     MCHC 30.5 (*)     RDW 15.6 (*)     Platelet Count 275      % Neutrophils 58      % Lymphocytes 31      % Monocytes 5      % Eosinophils 5      % Basophils 1      % Immature Granulocytes 0      NRBCs per 100 WBC 0      Absolute Neutrophils 5.0      Absolute Lymphocytes 2.6      Absolute Monocytes 0.4      Absolute Eosinophils 0.4      Absolute Basophils 0.1      Absolute Immature Granulocytes 0.0      Absolute NRBCs 0.0     BASIC METABOLIC PANEL - Abnormal    Sodium 142      Potassium 3.5      Chloride 104      Carbon Dioxide (CO2) 22      Anion Gap 16 (*)     Urea Nitrogen 7.9      Creatinine 0.83      GFR Estimate >90      Calcium 9.6      Glucose 92         Imaging   US Lower " Extremity Venous Duplex Right   Final Result   IMPRESSION:   1.  Positive for new and acute DVT in the right lower extremity involving the femoral, popliteal, and calf veins. DVT was not present on October 19, 2024.          Independent Interpretation   None    ED Course      Medications Administered   Medications   rivaroxaban ANTICOAGULANT (XARELTO) tablet 15 mg (has no administration in time range)       Procedures   Procedures     Discussion of Management   None    ED Course        Additional Documentation  None    Medical Decision Making / Diagnosis     CMS Diagnoses: None    MIPS       None    MDM   Shannan Savage is a 38 year old female who presents for evaluation of right lower extremity swelling paresthesias and discomfort.  ED evaluation shows evidence of acute DVT in the right lower extremity.  No phlegmasia cerulea dolens.  Peripheral perfusion is intact.  No motor deficits.  No clinical signs or symptoms to suggest pulmonary embolism or any cardiopulmonary complication.  No skin or soft tissue infection. No evidence of acute joint pathology. No contraindication to anticoagulation.  Patient was started on Xarelto.  First dose given in the emergency department.  I discussed the risks of bleeding with this medication and the need for close outpatient follow-up with primary care.  We discussed continued ambulation and use of compression stockings.  Renal function is normal.  CBC is normal.  Close PCP follow-up is recommended.  Return precautions were provided and patient was discharged in stable condition.    Disposition   The patient was discharged.     Diagnosis     ICD-10-CM    1. Acute deep vein thrombosis (DVT) of proximal vein of right lower extremity (H)  I82.4Y1            Discharge Medications   New Prescriptions    RIVAROXABAN ANTICOAGULANT (XARELTO) 15 MG TABS TABLET    Take 1 tablet (15 mg) by mouth 2 times daily (with meals) for 21 days. Take this dose for the first 21 days of treatment  then switch to the once daily dose.    RIVAROXABAN ANTICOAGULANT (XARELTO) 20 MG TABS TABLET    Take 1 tablet (20 mg) by mouth daily (with dinner). Complete the 15mg twice daily dose for 21 days before starting this dose.         MD Saira Jules, Clem Mckoy MD  11/25/24 2817

## 2025-01-25 ENCOUNTER — HEALTH MAINTENANCE LETTER (OUTPATIENT)
Age: 40
End: 2025-01-25

## 2025-02-13 ENCOUNTER — DOCUMENTATION ONLY (OUTPATIENT)
Dept: ANTICOAGULATION | Facility: CLINIC | Age: 40
End: 2025-02-13
Payer: COMMERCIAL